# Patient Record
Sex: MALE | Race: WHITE | Employment: FULL TIME | URBAN - METROPOLITAN AREA
[De-identification: names, ages, dates, MRNs, and addresses within clinical notes are randomized per-mention and may not be internally consistent; named-entity substitution may affect disease eponyms.]

---

## 2021-04-25 ENCOUNTER — ANESTHESIA EVENT (OUTPATIENT)
Dept: SURGERY | Age: 36
End: 2021-04-25
Payer: COMMERCIAL

## 2021-04-25 ENCOUNTER — APPOINTMENT (OUTPATIENT)
Dept: GENERAL RADIOLOGY | Age: 36
End: 2021-04-25
Attending: ORTHOPAEDIC SURGERY
Payer: COMMERCIAL

## 2021-04-25 ENCOUNTER — ANESTHESIA (OUTPATIENT)
Dept: SURGERY | Age: 36
End: 2021-04-25
Payer: COMMERCIAL

## 2021-04-25 ENCOUNTER — HOSPITAL ENCOUNTER (OUTPATIENT)
Age: 36
Setting detail: OBSERVATION
Discharge: HOME | End: 2021-04-26
Attending: EMERGENCY MEDICINE | Admitting: ORTHOPAEDIC SURGERY
Payer: COMMERCIAL

## 2021-04-25 ENCOUNTER — APPOINTMENT (OUTPATIENT)
Dept: GENERAL RADIOLOGY | Age: 36
End: 2021-04-25
Attending: EMERGENCY MEDICINE
Payer: COMMERCIAL

## 2021-04-25 ENCOUNTER — APPOINTMENT (OUTPATIENT)
Dept: CT IMAGING | Age: 36
End: 2021-04-25
Attending: EMERGENCY MEDICINE
Payer: COMMERCIAL

## 2021-04-25 DIAGNOSIS — S42.91XA CLOSED FRACTURE DISLOCATION OF JOINT OF RIGHT SHOULDER GIRDLE, INITIAL ENCOUNTER: Primary | ICD-10-CM

## 2021-04-25 DIAGNOSIS — S51.001A OPEN WOUND OF RIGHT ELBOW, INITIAL ENCOUNTER: ICD-10-CM

## 2021-04-25 PROBLEM — S51.011A ELBOW LACERATION, RIGHT, INITIAL ENCOUNTER: Status: ACTIVE | Noted: 2021-04-25

## 2021-04-25 LAB
ALBUMIN SERPL-MCNC: 4.1 G/DL (ref 3.5–5)
ALBUMIN/GLOB SERPL: 1.2 {RATIO} (ref 1.2–3.5)
ALP SERPL-CCNC: 63 U/L (ref 50–136)
ALT SERPL-CCNC: 47 U/L (ref 12–65)
ANION GAP SERPL CALC-SCNC: 6 MMOL/L (ref 7–16)
AST SERPL-CCNC: 32 U/L (ref 15–37)
BASOPHILS # BLD: 0.1 K/UL (ref 0–0.2)
BASOPHILS NFR BLD: 0 % (ref 0–2)
BILIRUB SERPL-MCNC: 0.3 MG/DL (ref 0.2–1.1)
BUN SERPL-MCNC: 17 MG/DL (ref 6–23)
CALCIUM SERPL-MCNC: 9.1 MG/DL (ref 8.3–10.4)
CHLORIDE SERPL-SCNC: 107 MMOL/L (ref 98–107)
CO2 SERPL-SCNC: 27 MMOL/L (ref 21–32)
CREAT SERPL-MCNC: 1.31 MG/DL (ref 0.8–1.5)
DIFFERENTIAL METHOD BLD: ABNORMAL
EOSINOPHIL # BLD: 0.1 K/UL (ref 0–0.8)
EOSINOPHIL NFR BLD: 1 % (ref 0.5–7.8)
ERYTHROCYTE [DISTWIDTH] IN BLOOD BY AUTOMATED COUNT: 13.2 % (ref 11.9–14.6)
GLOBULIN SER CALC-MCNC: 3.3 G/DL (ref 2.3–3.5)
GLUCOSE SERPL-MCNC: 106 MG/DL (ref 65–100)
HCT VFR BLD AUTO: 42.4 % (ref 41.1–50.3)
HGB BLD-MCNC: 14.5 G/DL (ref 13.6–17.2)
IMM GRANULOCYTES # BLD AUTO: 0.1 K/UL (ref 0–0.5)
IMM GRANULOCYTES NFR BLD AUTO: 1 % (ref 0–5)
LYMPHOCYTES # BLD: 1.2 K/UL (ref 0.5–4.6)
LYMPHOCYTES NFR BLD: 6 % (ref 13–44)
MCH RBC QN AUTO: 31.8 PG (ref 26.1–32.9)
MCHC RBC AUTO-ENTMCNC: 34.2 G/DL (ref 31.4–35)
MCV RBC AUTO: 93 FL (ref 79.6–97.8)
MONOCYTES # BLD: 0.8 K/UL (ref 0.1–1.3)
MONOCYTES NFR BLD: 5 % (ref 4–12)
NEUTS SEG # BLD: 16.1 K/UL (ref 1.7–8.2)
NEUTS SEG NFR BLD: 88 % (ref 43–78)
NRBC # BLD: 0 K/UL (ref 0–0.2)
PLATELET # BLD AUTO: 283 K/UL (ref 150–450)
PMV BLD AUTO: 9.3 FL (ref 9.4–12.3)
POTASSIUM SERPL-SCNC: 3.8 MMOL/L (ref 3.5–5.1)
PROT SERPL-MCNC: 7.4 G/DL (ref 6.3–8.2)
RBC # BLD AUTO: 4.56 M/UL (ref 4.23–5.6)
SODIUM SERPL-SCNC: 140 MMOL/L (ref 136–145)
WBC # BLD AUTO: 18.3 K/UL (ref 4.3–11.1)

## 2021-04-25 PROCEDURE — 74011000258 HC RX REV CODE- 258: Performed by: EMERGENCY MEDICINE

## 2021-04-25 PROCEDURE — 74011000636 HC RX REV CODE- 636: Performed by: EMERGENCY MEDICINE

## 2021-04-25 PROCEDURE — 99218 HC RM OBSERVATION: CPT

## 2021-04-25 PROCEDURE — 74011250636 HC RX REV CODE- 250/636: Performed by: ANESTHESIOLOGY

## 2021-04-25 PROCEDURE — 99285 EMERGENCY DEPT VISIT HI MDM: CPT

## 2021-04-25 PROCEDURE — 77030003602 HC NDL NRV BLK BBMI -B: Performed by: ANESTHESIOLOGY

## 2021-04-25 PROCEDURE — 96376 TX/PRO/DX INJ SAME DRUG ADON: CPT

## 2021-04-25 PROCEDURE — 96375 TX/PRO/DX INJ NEW DRUG ADDON: CPT

## 2021-04-25 PROCEDURE — 73030 X-RAY EXAM OF SHOULDER: CPT

## 2021-04-25 PROCEDURE — 74011250637 HC RX REV CODE- 250/637: Performed by: ORTHOPAEDIC SURGERY

## 2021-04-25 PROCEDURE — 74011250636 HC RX REV CODE- 250/636: Performed by: ORTHOPAEDIC SURGERY

## 2021-04-25 PROCEDURE — 73080 X-RAY EXAM OF ELBOW: CPT

## 2021-04-25 PROCEDURE — 77030040361 HC SLV COMPR DVT MDII -B: Performed by: ORTHOPAEDIC SURGERY

## 2021-04-25 PROCEDURE — 76010000161 HC OR TIME 1 TO 1.5 HR INTENSV-TIER 1: Performed by: ORTHOPAEDIC SURGERY

## 2021-04-25 PROCEDURE — 77030039425 HC BLD LARYNG TRULITE DISP TELE -A: Performed by: ANESTHESIOLOGY

## 2021-04-25 PROCEDURE — A4565 SLINGS: HCPCS | Performed by: ORTHOPAEDIC SURGERY

## 2021-04-25 PROCEDURE — 77030008771 HC TU NG SALEM SUMP -A: Performed by: ANESTHESIOLOGY

## 2021-04-25 PROCEDURE — 74011250636 HC RX REV CODE- 250/636: Performed by: REGISTERED NURSE

## 2021-04-25 PROCEDURE — 80053 COMPREHEN METABOLIC PANEL: CPT

## 2021-04-25 PROCEDURE — 11043 DBRDMT MUSC&/FSCA 1ST 20/<: CPT | Performed by: ORTHOPAEDIC SURGERY

## 2021-04-25 PROCEDURE — 23665 CLTX SHO DSLC FX GR HMRL TBR: CPT | Performed by: ORTHOPAEDIC SURGERY

## 2021-04-25 PROCEDURE — 77030037088 HC TUBE ENDOTRACH ORAL NSL COVD-A: Performed by: ANESTHESIOLOGY

## 2021-04-25 PROCEDURE — 96365 THER/PROPH/DIAG IV INF INIT: CPT

## 2021-04-25 PROCEDURE — 74011000250 HC RX REV CODE- 250: Performed by: ANESTHESIOLOGY

## 2021-04-25 PROCEDURE — 74011000250 HC RX REV CODE- 250: Performed by: REGISTERED NURSE

## 2021-04-25 PROCEDURE — 2709999900 HC NON-CHARGEABLE SUPPLY: Performed by: ORTHOPAEDIC SURGERY

## 2021-04-25 PROCEDURE — 77030013708 HC HNDPC SUC IRR PULS STRY –B: Performed by: ORTHOPAEDIC SURGERY

## 2021-04-25 PROCEDURE — 76210000006 HC OR PH I REC 0.5 TO 1 HR: Performed by: ORTHOPAEDIC SURGERY

## 2021-04-25 PROCEDURE — 74177 CT ABD & PELVIS W/CONTRAST: CPT

## 2021-04-25 PROCEDURE — 85025 COMPLETE CBC W/AUTO DIFF WBC: CPT

## 2021-04-25 PROCEDURE — 76060000034 HC ANESTHESIA 1.5 TO 2 HR: Performed by: ORTHOPAEDIC SURGERY

## 2021-04-25 PROCEDURE — 74011250636 HC RX REV CODE- 250/636: Performed by: EMERGENCY MEDICINE

## 2021-04-25 PROCEDURE — 77030002916 HC SUT ETHLN J&J -A: Performed by: ORTHOPAEDIC SURGERY

## 2021-04-25 PROCEDURE — 77030040922 HC BLNKT HYPOTHRM STRY -A: Performed by: ANESTHESIOLOGY

## 2021-04-25 PROCEDURE — 76010010054 HC POST OP PAIN BLOCK: Performed by: ORTHOPAEDIC SURGERY

## 2021-04-25 RX ORDER — ROCURONIUM BROMIDE 10 MG/ML
INJECTION, SOLUTION INTRAVENOUS AS NEEDED
Status: DISCONTINUED | OUTPATIENT
Start: 2021-04-25 | End: 2021-04-25 | Stop reason: HOSPADM

## 2021-04-25 RX ORDER — AMOXICILLIN 250 MG
1 CAPSULE ORAL
Status: DISCONTINUED | OUTPATIENT
Start: 2021-04-25 | End: 2021-04-26 | Stop reason: HOSPADM

## 2021-04-25 RX ORDER — HYDROMORPHONE HYDROCHLORIDE 1 MG/ML
0.5 INJECTION, SOLUTION INTRAMUSCULAR; INTRAVENOUS; SUBCUTANEOUS
Status: DISCONTINUED | OUTPATIENT
Start: 2021-04-25 | End: 2021-04-25 | Stop reason: HOSPADM

## 2021-04-25 RX ORDER — HYDROMORPHONE HYDROCHLORIDE 1 MG/ML
1 INJECTION, SOLUTION INTRAMUSCULAR; INTRAVENOUS; SUBCUTANEOUS
Status: COMPLETED | OUTPATIENT
Start: 2021-04-25 | End: 2021-04-25

## 2021-04-25 RX ORDER — SODIUM CHLORIDE 0.9 % (FLUSH) 0.9 %
5-40 SYRINGE (ML) INJECTION AS NEEDED
Status: DISCONTINUED | OUTPATIENT
Start: 2021-04-25 | End: 2021-04-25 | Stop reason: HOSPADM

## 2021-04-25 RX ORDER — SUCCINYLCHOLINE CHLORIDE 20 MG/ML
INJECTION INTRAMUSCULAR; INTRAVENOUS AS NEEDED
Status: DISCONTINUED | OUTPATIENT
Start: 2021-04-25 | End: 2021-04-25 | Stop reason: HOSPADM

## 2021-04-25 RX ORDER — HYDROMORPHONE HYDROCHLORIDE 1 MG/ML
0.4 INJECTION, SOLUTION INTRAMUSCULAR; INTRAVENOUS; SUBCUTANEOUS
Status: COMPLETED | OUTPATIENT
Start: 2021-04-25 | End: 2021-04-25

## 2021-04-25 RX ORDER — ASPIRIN 325 MG
325 TABLET, DELAYED RELEASE (ENTERIC COATED) ORAL DAILY
Status: DISCONTINUED | OUTPATIENT
Start: 2021-04-26 | End: 2021-04-26 | Stop reason: HOSPADM

## 2021-04-25 RX ORDER — SODIUM CHLORIDE 0.9 % (FLUSH) 0.9 %
5-40 SYRINGE (ML) INJECTION AS NEEDED
Status: DISCONTINUED | OUTPATIENT
Start: 2021-04-25 | End: 2021-04-26 | Stop reason: HOSPADM

## 2021-04-25 RX ORDER — DIPHENHYDRAMINE HCL 25 MG
25 CAPSULE ORAL
Status: DISCONTINUED | OUTPATIENT
Start: 2021-04-25 | End: 2021-04-26 | Stop reason: HOSPADM

## 2021-04-25 RX ORDER — SODIUM CHLORIDE 9 MG/ML
100 INJECTION, SOLUTION INTRAVENOUS CONTINUOUS
Status: DISCONTINUED | OUTPATIENT
Start: 2021-04-26 | End: 2021-04-26 | Stop reason: HOSPADM

## 2021-04-25 RX ORDER — DIPHENHYDRAMINE HYDROCHLORIDE 50 MG/ML
12.5 INJECTION, SOLUTION INTRAMUSCULAR; INTRAVENOUS
Status: DISCONTINUED | OUTPATIENT
Start: 2021-04-25 | End: 2021-04-25 | Stop reason: HOSPADM

## 2021-04-25 RX ORDER — SODIUM CHLORIDE 0.9 % (FLUSH) 0.9 %
5-40 SYRINGE (ML) INJECTION EVERY 8 HOURS
Status: DISCONTINUED | OUTPATIENT
Start: 2021-04-25 | End: 2021-04-25 | Stop reason: HOSPADM

## 2021-04-25 RX ORDER — SODIUM CHLORIDE, SODIUM LACTATE, POTASSIUM CHLORIDE, CALCIUM CHLORIDE 600; 310; 30; 20 MG/100ML; MG/100ML; MG/100ML; MG/100ML
100 INJECTION, SOLUTION INTRAVENOUS CONTINUOUS
Status: DISCONTINUED | OUTPATIENT
Start: 2021-04-25 | End: 2021-04-25 | Stop reason: HOSPADM

## 2021-04-25 RX ORDER — SODIUM CHLORIDE, SODIUM LACTATE, POTASSIUM CHLORIDE, CALCIUM CHLORIDE 600; 310; 30; 20 MG/100ML; MG/100ML; MG/100ML; MG/100ML
INJECTION, SOLUTION INTRAVENOUS
Status: DISCONTINUED | OUTPATIENT
Start: 2021-04-25 | End: 2021-04-25 | Stop reason: HOSPADM

## 2021-04-25 RX ORDER — DEXAMETHASONE SODIUM PHOSPHATE 4 MG/ML
INJECTION, SOLUTION INTRA-ARTICULAR; INTRALESIONAL; INTRAMUSCULAR; INTRAVENOUS; SOFT TISSUE AS NEEDED
Status: DISCONTINUED | OUTPATIENT
Start: 2021-04-25 | End: 2021-04-25 | Stop reason: HOSPADM

## 2021-04-25 RX ORDER — CEFAZOLIN SODIUM/WATER 2 G/20 ML
2 SYRINGE (ML) INTRAVENOUS ONCE
Status: COMPLETED | OUTPATIENT
Start: 2021-04-25 | End: 2021-04-25

## 2021-04-25 RX ORDER — ONDANSETRON 2 MG/ML
INJECTION INTRAMUSCULAR; INTRAVENOUS AS NEEDED
Status: DISCONTINUED | OUTPATIENT
Start: 2021-04-25 | End: 2021-04-25 | Stop reason: HOSPADM

## 2021-04-25 RX ORDER — KETOROLAC TROMETHAMINE 30 MG/ML
INJECTION, SOLUTION INTRAMUSCULAR; INTRAVENOUS AS NEEDED
Status: DISCONTINUED | OUTPATIENT
Start: 2021-04-25 | End: 2021-04-25 | Stop reason: HOSPADM

## 2021-04-25 RX ORDER — OXYCODONE AND ACETAMINOPHEN 5; 325 MG/1; MG/1
1 TABLET ORAL AS NEEDED
Status: DISCONTINUED | OUTPATIENT
Start: 2021-04-25 | End: 2021-04-25 | Stop reason: HOSPADM

## 2021-04-25 RX ORDER — HYDROMORPHONE HYDROCHLORIDE 1 MG/ML
0.5 INJECTION, SOLUTION INTRAMUSCULAR; INTRAVENOUS; SUBCUTANEOUS
Status: COMPLETED | OUTPATIENT
Start: 2021-04-25 | End: 2021-04-25

## 2021-04-25 RX ORDER — ACETAMINOPHEN 325 MG/1
975 TABLET ORAL ONCE
Status: COMPLETED | OUTPATIENT
Start: 2021-04-25 | End: 2021-04-25

## 2021-04-25 RX ORDER — MIDAZOLAM HYDROCHLORIDE 1 MG/ML
2 INJECTION, SOLUTION INTRAMUSCULAR; INTRAVENOUS ONCE
Status: DISCONTINUED | OUTPATIENT
Start: 2021-04-25 | End: 2021-04-25 | Stop reason: HOSPADM

## 2021-04-25 RX ORDER — LIDOCAINE HYDROCHLORIDE 20 MG/ML
INJECTION, SOLUTION EPIDURAL; INFILTRATION; INTRACAUDAL; PERINEURAL AS NEEDED
Status: DISCONTINUED | OUTPATIENT
Start: 2021-04-25 | End: 2021-04-25 | Stop reason: HOSPADM

## 2021-04-25 RX ORDER — BUPIVACAINE HYDROCHLORIDE AND EPINEPHRINE 2.5; 5 MG/ML; UG/ML
INJECTION, SOLUTION EPIDURAL; INFILTRATION; INTRACAUDAL; PERINEURAL
Status: COMPLETED | OUTPATIENT
Start: 2021-04-25 | End: 2021-04-25

## 2021-04-25 RX ORDER — HYDROMORPHONE HYDROCHLORIDE 1 MG/ML
.5-1 INJECTION, SOLUTION INTRAMUSCULAR; INTRAVENOUS; SUBCUTANEOUS
Status: DISCONTINUED | OUTPATIENT
Start: 2021-04-25 | End: 2021-04-26 | Stop reason: HOSPADM

## 2021-04-25 RX ORDER — CEFAZOLIN SODIUM/WATER 2 G/20 ML
2 SYRINGE (ML) INTRAVENOUS EVERY 8 HOURS
Status: COMPLETED | OUTPATIENT
Start: 2021-04-26 | End: 2021-04-26

## 2021-04-25 RX ORDER — SODIUM CHLORIDE 0.9 % (FLUSH) 0.9 %
10 SYRINGE (ML) INJECTION
Status: COMPLETED | OUTPATIENT
Start: 2021-04-25 | End: 2021-04-25

## 2021-04-25 RX ORDER — LIDOCAINE HYDROCHLORIDE 20 MG/ML
INJECTION, SOLUTION EPIDURAL; INFILTRATION; INTRACAUDAL; PERINEURAL
Status: COMPLETED | OUTPATIENT
Start: 2021-04-25 | End: 2021-04-25

## 2021-04-25 RX ORDER — ACETAMINOPHEN 650 MG/1
650 SUPPOSITORY RECTAL ONCE
Status: COMPLETED | OUTPATIENT
Start: 2021-04-26 | End: 2021-04-25

## 2021-04-25 RX ORDER — FENTANYL CITRATE 50 UG/ML
INJECTION, SOLUTION INTRAMUSCULAR; INTRAVENOUS AS NEEDED
Status: DISCONTINUED | OUTPATIENT
Start: 2021-04-25 | End: 2021-04-25 | Stop reason: HOSPADM

## 2021-04-25 RX ORDER — BUPIVACAINE HYDROCHLORIDE AND EPINEPHRINE 5; 5 MG/ML; UG/ML
INJECTION, SOLUTION EPIDURAL; INTRACAUDAL; PERINEURAL
Status: COMPLETED | OUTPATIENT
Start: 2021-04-25 | End: 2021-04-25

## 2021-04-25 RX ORDER — SODIUM CHLORIDE 0.9 % (FLUSH) 0.9 %
5-40 SYRINGE (ML) INJECTION EVERY 8 HOURS
Status: DISCONTINUED | OUTPATIENT
Start: 2021-04-26 | End: 2021-04-26 | Stop reason: HOSPADM

## 2021-04-25 RX ORDER — NALOXONE HYDROCHLORIDE 0.4 MG/ML
0.4 INJECTION, SOLUTION INTRAMUSCULAR; INTRAVENOUS; SUBCUTANEOUS
Status: DISCONTINUED | OUTPATIENT
Start: 2021-04-25 | End: 2021-04-26 | Stop reason: HOSPADM

## 2021-04-25 RX ORDER — OXYCODONE AND ACETAMINOPHEN 7.5; 325 MG/1; MG/1
1-2 TABLET ORAL
Status: DISCONTINUED | OUTPATIENT
Start: 2021-04-25 | End: 2021-04-26 | Stop reason: HOSPADM

## 2021-04-25 RX ORDER — OXYCODONE HYDROCHLORIDE 5 MG/1
5 TABLET ORAL
Status: DISCONTINUED | OUTPATIENT
Start: 2021-04-25 | End: 2021-04-25 | Stop reason: HOSPADM

## 2021-04-25 RX ORDER — PROPOFOL 10 MG/ML
INJECTION, EMULSION INTRAVENOUS AS NEEDED
Status: DISCONTINUED | OUTPATIENT
Start: 2021-04-25 | End: 2021-04-25 | Stop reason: HOSPADM

## 2021-04-25 RX ORDER — ONDANSETRON 2 MG/ML
4 INJECTION INTRAMUSCULAR; INTRAVENOUS
Status: DISCONTINUED | OUTPATIENT
Start: 2021-04-25 | End: 2021-04-26 | Stop reason: HOSPADM

## 2021-04-25 RX ADMIN — SODIUM CHLORIDE, SODIUM LACTATE, POTASSIUM CHLORIDE, AND CALCIUM CHLORIDE 1000 ML: 600; 310; 30; 20 INJECTION, SOLUTION INTRAVENOUS at 16:46

## 2021-04-25 RX ADMIN — DEXAMETHASONE SODIUM PHOSPHATE 10 MG: 4 INJECTION, SOLUTION INTRAMUSCULAR; INTRAVENOUS at 21:39

## 2021-04-25 RX ADMIN — KETOROLAC TROMETHAMINE 30 MG: 30 INJECTION, SOLUTION INTRAMUSCULAR at 21:55

## 2021-04-25 RX ADMIN — SODIUM CHLORIDE 100 ML/HR: 900 INJECTION, SOLUTION INTRAVENOUS at 23:37

## 2021-04-25 RX ADMIN — PHENYLEPHRINE HYDROCHLORIDE 100 MCG: 10 INJECTION INTRAVENOUS at 21:41

## 2021-04-25 RX ADMIN — SODIUM CHLORIDE, SODIUM LACTATE, POTASSIUM CHLORIDE, AND CALCIUM CHLORIDE: 600; 310; 30; 20 INJECTION, SOLUTION INTRAVENOUS at 21:52

## 2021-04-25 RX ADMIN — BUPIVACAINE HYDROCHLORIDE AND EPINEPHRINE BITARTRATE 15 ML: 2.5; .005 INJECTION, SOLUTION EPIDURAL; INFILTRATION; INTRACAUDAL; PERINEURAL at 20:47

## 2021-04-25 RX ADMIN — MIDAZOLAM 2 MG: 1 INJECTION INTRAMUSCULAR; INTRAVENOUS at 20:41

## 2021-04-25 RX ADMIN — IOPAMIDOL 100 ML: 755 INJECTION, SOLUTION INTRAVENOUS at 18:14

## 2021-04-25 RX ADMIN — SUCCINYLCHOLINE CHLORIDE 200 MG: 20 INJECTION, SOLUTION INTRAMUSCULAR; INTRAVENOUS at 20:59

## 2021-04-25 RX ADMIN — ONDANSETRON 4 MG: 2 INJECTION INTRAMUSCULAR; INTRAVENOUS at 21:39

## 2021-04-25 RX ADMIN — HYDROMORPHONE HYDROCHLORIDE 1 MG: 1 INJECTION, SOLUTION INTRAMUSCULAR; INTRAVENOUS; SUBCUTANEOUS at 19:27

## 2021-04-25 RX ADMIN — BUPIVACAINE HYDROCHLORIDE AND EPINEPHRINE BITARTRATE 15 ML: 5; .005 INJECTION, SOLUTION EPIDURAL; INTRACAUDAL; PERINEURAL at 20:47

## 2021-04-25 RX ADMIN — HYDROMORPHONE HYDROCHLORIDE 0.5 MG: 1 INJECTION, SOLUTION INTRAMUSCULAR; INTRAVENOUS; SUBCUTANEOUS at 17:54

## 2021-04-25 RX ADMIN — ROCURONIUM BROMIDE 5 MG: 10 INJECTION, SOLUTION INTRAVENOUS at 20:59

## 2021-04-25 RX ADMIN — PROPOFOL 200 MG: 10 INJECTION, EMULSION INTRAVENOUS at 20:59

## 2021-04-25 RX ADMIN — SODIUM CHLORIDE 100 ML: 900 INJECTION, SOLUTION INTRAVENOUS at 18:14

## 2021-04-25 RX ADMIN — PHENYLEPHRINE HYDROCHLORIDE 100 MCG: 10 INJECTION INTRAVENOUS at 21:48

## 2021-04-25 RX ADMIN — HYDROMORPHONE HYDROCHLORIDE 0.4 MG: 1 INJECTION, SOLUTION INTRAMUSCULAR; INTRAVENOUS; SUBCUTANEOUS at 16:47

## 2021-04-25 RX ADMIN — PROPOFOL 40 MG: 10 INJECTION, EMULSION INTRAVENOUS at 22:13

## 2021-04-25 RX ADMIN — PROPOFOL 20 MG: 10 INJECTION, EMULSION INTRAVENOUS at 20:43

## 2021-04-25 RX ADMIN — SODIUM CHLORIDE, SODIUM LACTATE, POTASSIUM CHLORIDE, AND CALCIUM CHLORIDE: 600; 310; 30; 20 INJECTION, SOLUTION INTRAVENOUS at 20:55

## 2021-04-25 RX ADMIN — Medication 10 ML: at 18:15

## 2021-04-25 RX ADMIN — LIDOCAINE HYDROCHLORIDE 10 ML: 20 INJECTION, SOLUTION EPIDURAL; INFILTRATION; INTRACAUDAL; PERINEURAL at 20:47

## 2021-04-25 RX ADMIN — CEFAZOLIN 1 G: 1 INJECTION, POWDER, FOR SOLUTION INTRAVENOUS at 21:07

## 2021-04-25 RX ADMIN — LIDOCAINE HYDROCHLORIDE 100 MG: 20 INJECTION, SOLUTION EPIDURAL; INFILTRATION; INTRACAUDAL; PERINEURAL at 20:59

## 2021-04-25 RX ADMIN — ACETAMINOPHEN 975 MG: 325 TABLET ORAL at 23:37

## 2021-04-25 RX ADMIN — FENTANYL CITRATE 50 MCG: 50 INJECTION INTRAMUSCULAR; INTRAVENOUS at 20:59

## 2021-04-25 RX ADMIN — CEFAZOLIN 1 G: 1 INJECTION, POWDER, FOR SOLUTION INTRAMUSCULAR; INTRAVENOUS; PARENTERAL at 20:02

## 2021-04-25 RX ADMIN — PROPOFOL 30 MG: 10 INJECTION, EMULSION INTRAVENOUS at 20:41
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
## 2021-04-25 NOTE — ED NOTES
Report from [facility], [address] [place] [address]. Assumed care of pt at this time.
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
## 2021-04-26 ENCOUNTER — APPOINTMENT (OUTPATIENT)
Dept: CT IMAGING | Age: 36
End: 2021-04-26
Attending: PHYSICIAN ASSISTANT
Payer: COMMERCIAL

## 2021-04-26 VITALS
SYSTOLIC BLOOD PRESSURE: 112 MMHG | WEIGHT: 189 LBS | RESPIRATION RATE: 16 BRPM | HEIGHT: 72 IN | OXYGEN SATURATION: 95 % | TEMPERATURE: 98.4 F | DIASTOLIC BLOOD PRESSURE: 59 MMHG | HEART RATE: 94 BPM | BODY MASS INDEX: 25.6 KG/M2

## 2021-04-26 PROCEDURE — 74011000250 HC RX REV CODE- 250: Performed by: ORTHOPAEDIC SURGERY

## 2021-04-26 PROCEDURE — 94760 N-INVAS EAR/PLS OXIMETRY 1: CPT

## 2021-04-26 PROCEDURE — 2709999900 HC NON-CHARGEABLE SUPPLY

## 2021-04-26 PROCEDURE — 74011250637 HC RX REV CODE- 250/637: Performed by: ORTHOPAEDIC SURGERY

## 2021-04-26 PROCEDURE — 73200 CT UPPER EXTREMITY W/O DYE: CPT

## 2021-04-26 PROCEDURE — 99218 HC RM OBSERVATION: CPT

## 2021-04-26 PROCEDURE — 74011250636 HC RX REV CODE- 250/636: Performed by: ORTHOPAEDIC SURGERY

## 2021-04-26 RX ORDER — ASPIRIN 325 MG
325 TABLET, DELAYED RELEASE (ENTERIC COATED) ORAL DAILY
Qty: 21 TAB | Refills: 0 | Status: SHIPPED | OUTPATIENT
Start: 2021-04-27 | End: 2021-05-04

## 2021-04-26 RX ORDER — AMOXICILLIN 250 MG
1 CAPSULE ORAL
Qty: 30 TAB | Refills: 0 | Status: SHIPPED | OUTPATIENT
Start: 2021-04-26 | End: 2021-08-12

## 2021-04-26 RX ORDER — CEPHALEXIN 500 MG/1
500 CAPSULE ORAL 4 TIMES DAILY
Qty: 28 CAP | Refills: 0 | Status: SHIPPED | OUTPATIENT
Start: 2021-04-26 | End: 2021-05-04

## 2021-04-26 RX ORDER — OXYCODONE AND ACETAMINOPHEN 7.5; 325 MG/1; MG/1
1-2 TABLET ORAL
Qty: 40 TAB | Refills: 0 | Status: SHIPPED | OUTPATIENT
Start: 2021-04-26 | End: 2021-04-29

## 2021-04-26 RX ADMIN — ASPIRIN 325 MG: 325 TABLET, COATED ORAL at 08:52

## 2021-04-26 RX ADMIN — CEFAZOLIN SODIUM 2 G: 10 INJECTION, POWDER, FOR SOLUTION INTRAVENOUS at 12:02

## 2021-04-26 RX ADMIN — OXYCODONE HYDROCHLORIDE AND ACETAMINOPHEN 2 TABLET: 7.5; 325 TABLET ORAL at 13:20

## 2021-04-26 RX ADMIN — CEFAZOLIN SODIUM 2 G: 10 INJECTION, POWDER, FOR SOLUTION INTRAVENOUS at 05:44

## 2021-04-26 NOTE — DISCHARGE INSTRUCTIONS
Shoulder Postoperative Instructions    Returning Home  1. Your pain after surgery will vary depending on the method of anesthesia used and from patient to patient. In the first 24 hours, pain medication should be taken regularly with small amounts of food. During this time, nausea and light-headedness are common and should improve in 2-5 days. Drinking fluids may help. If nausea persists, medicine can be prescribed by calling your doctor at [phone]. Leaving the Outpatient Surgery Center:  As you leave the surgery center, you will be in a sling. Make sure that you have a large shirt or a button up shirt to wear home. For the first week:  1. Sleeping and resting will be more comfortable if you are propped up in bed or have access to a recliner. 2. Ice your shoulder to help manage the pain. 20 minutes of ice every hour as needed. 3. You may come out of the sling 3-5x/day to do elbow, wrist and hand range of motion, and other home exercises (listed further down in - Home Excersizes) so your other joints do not get stiff. Sling Use  You will be in the sling for comfort. You may take the sling off to do your home exercises or physical therapy, or shower as well. Care of Your Incisions  1. Incisions and stitches are often checked/removed 6 to 10 days after surgery. 2. Moderate bleeding may occur at the incision sites. This should decrease quickly over time. 3. Leave the dressings from surgery in place for 48 to 72 hours. The bulky dressings may be removed and replaced with fresh gauze at that time, but leave on the small tape strips on the incision sites. 4. Watch the wound for increasing redness, tenderness, swelling, and pus drainage daily. These can be early signs of infection. If you notice any of these signs of infection please call [phone]. A mild fever during the first few days after surgery is not uncommon.  This often occurs and can be treated with deep breathing, coughing to clear the lungs, and walking. However, fevers, increasing pain, and swelling at the incisions should be reported immediately. Showering:   Until your sutures are removed, you should consider covering your shoulder in saran wrap with tape for showering.  A plastic chair in your shower will allow you to sit.  Sponge bathing is also an option.  In general, after your sutures have been removed you may allow your incisions to get wet in the shower. Post-Operative Pain Management  ANESTHESIA: You will meet with an anesthesiologist on the day of surgery to discuss your anesthesia. You will have general anesthesia and often will have a nerve block. MEDICATIONS: You will be given a prescription for medications. Please take them as directed on the label and with food.  Certain pain medications may contain Tylenol(Acetaminophen). It is important not to take any additional Tylenol while on these pain medications.  Do not mix your pain medications with alcohol.  You should not drive while taking pain medications as they increase your liability and delay your responses.  Your physician will most likely talk with you about taking Aspirin 81 mg or 325 mg (ECASA) once daily for two-three weeks after surgery. This is done in order to help minimize the risk for a blood clot from developing, which is a possible complication after any surgery. If you have Ulcers or stomach irritation do not take this medicine. Be careful taking aspirin and other NSAID's as it can increase your risk of gastric irritation and other side effects.  If you have any questions or concerns regarding your medications, please call the office. · Common side effects of the narcotics include nausea, vomiting, drowsiness, constipation, and difficulty urinating. If you experience constipation, drink lots of water/Gatorade, avoid soda and diet drinks. Eat plenty of fiber.  You may take a stool softener: Colace 100mg twice a day for the first week. For severe constipation use magnesium citrate, one 8 oz bottle. All can be bought at the pharmacy. Diet and General Conditioning  Aerobic conditioning and diet are both very important after surgery. In general, we recommend that you make sure to avoid skipping meals, eat a balanced diet including regular portions of fruits and vegetables, and avoid relying on fast foods while you are recovering from surgery. Also, consider taking a daily multi-vitamin. Participate in some form of aerobic conditioning after surgery. Speak with your physical therapist or call our office to determine an appropriate form of exercise after surgery. Initially, your exercise will need to be modified after surgery. Follow Up Visits  Doctor  Plan on seeing your surgeon at 1 week, 1 month, 3 months, and 6 months after surgery. If your shoulder does not progress as planned, you are welcome to schedule additional visits. There is usually no charge for surgery related visits 90 days following surgery. You will receive a bill for any x-rays or special equipment (such as a brace). If you call the office please have us paged instead of leaving a voice mail so that we can immediately take care of your needs.     [address]                 Phone [phone]  [place] [address]                 Fax [phone]                             Danilo Mirza
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
## 2021-04-26 NOTE — PROGRESS NOTES
Pt in bed resting, bed low and locked, call light within reach, side rails up x3. Shift assessment complete, pt has +2 radial and pedal pulses, RUE is numb and pt cannot yet squeeze with that hand. Pt had voided 400ml on arrival and complains of no pain. no needs at this time, will continue to monitor.

## 2021-04-26 NOTE — PROGRESS NOTES
2021         Post Op day: 1 Day Post-Op     Admit Date: 2021  Admit Diagnosis: Closed fracture dislocation of right shoulder joint [S42.91XA]    Subjective: Doing well, No complaints, No SOB, No Chest Pain, No Nausea or Vomitting. PT/OT:            Assistive Device: Sling                Weight Bearing Status: NWB RUE  BMP:  Recent Labs     21  1651   CREA 1.31   BUN 17      K 3.8      CO2 27   AGAP 6*   *     Patient Vitals for the past 8 hrs:   BP Temp Pulse Resp SpO2   21 0805     95 %   21 0703 107/60 98.2 °F (36.8 °C) 68 18 97 %     Temp (24hrs), Av °F (36.7 °C), Min:97.5 °F (36.4 °C), Max:98.2 °F (36.8 °C)    CBC:  Recent Labs     21  1651   WBC 18.3*   GRANS 88*   MONOS 5   EOS 1   ANEU 16.1*   ABL 1.2   HGB 14.5   HCT 42.4          Microbiology:     All Micro Results     None        Objective: Vital Signs are Stable, No Acute Distress, Alert and Oriented  Dressing is Dry,  Neurovascular exam is normal.    Assessment:  Patient Active Problem List   Diagnosis Code    Traumatic closed displaced fracture of right shoulder with anterior dislocation S42. 91XA    Elbow laceration, right, initial encounter S51.011A    Closed fracture dislocation of right shoulder joint S42. 91XA       Plan:     Dispo after last dose of ancef. Will be on keflex 7 days.  Will fu on Thursday am.       Signed By: Magdi Toledo MD

## 2021-04-26 NOTE — PROGRESS NOTES
Pt resting in bed, call light within reach, side rails up x 3, and bed low and locked. Shift assessment complete. Pt able to move fingers on right hand with + 2 radial pulse. Pt states he has no pain at this time. Pt educated on how to order food. No needs at this time.
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
## 2021-04-26 NOTE — PROGRESS NOTES
2021         Post Op day: 1 Day Post-Op     Admit Date: 2021  Admit Diagnosis: Closed fracture dislocation of right shoulder joint [S42.91XA]    Subjective: Doing well s/p closed reduction of right shoulder and irrigation and debridement of right elbow, No complaints, No SOB, No Chest Pain, No Nausea or Vomitting. PT/OT:            Assistive Device: Sling                Weight Bearing Status: RUE in sling  BMP:  Recent Labs     21  1651   CREA 1.31   BUN 17      K 3.8      CO2 27   AGAP 6*   *     Patient Vitals for the past 8 hrs:   BP Temp Pulse Resp SpO2   21 0805     95 %   21 0703 107/60 98.2 °F (36.8 °C) 68 18 97 %   21 0319 105/62 98.1 °F (36.7 °C) 60 18 97 %     Temp (24hrs), Av °F (36.7 °C), Min:97.5 °F (36.4 °C), Max:98.2 °F (36.8 °C)    CBC:  Recent Labs     21  1651   WBC 18.3*   GRANS 88*   MONOS 5   EOS 1   ANEU 16.1*   ABL 1.2   HGB 14.5   HCT 42.4          Microbiology:     All Micro Results     None        Objective: Vital Signs are Stable, No Acute Distress, Alert and Oriented  Dressing is Dry,  Neurovascular exam is normal.    CT:   FINDINGS: Again demonstrated is a comminuted and displaced fracture of the  proximal right anterior, superior, and lateral humerus. No evidence of humerus  fracture line extending to the articular surface.     Additionally there is an oblique minimally displaced fracture of the inferior  scapular glenoid, with extension to the articular surface. No evidence of  dislocation at this time. Small amount surrounding fluid and hematoma noted  within soft tissues.      The trachea demonstrates a trace amount of intraluminal debris. The lung apices  demonstrate no pneumothorax. There is partially visualized dependent opacity in  the right posterior lung most likely representing atelectasis or pneumonitis.   Edema and stranding are noted in the right axillary soft tissues of unclear  significance and etiology.        IMPRESSION  1. Comminuted displaced fracture of proximal right humerus. 2. Right scapula fracture. Assessment:  Patient Active Problem List   Diagnosis Code    Traumatic closed displaced fracture of right shoulder with anterior dislocation S42. 91XA    Elbow laceration, right, initial encounter S51.011A    Closed fracture dislocation of right shoulder joint S42. 91XA       Plan: Monitor labs  CT performed today - resulted comminuted displaced fracture of proximal right humerus, inferior glenoid fracture   Discussed with patient it will require surgical intervention, but we need to resolve elbow first.   Will D/C home with close FU on Thursday     Signed By: FRANDY uHtchinson

## 2021-04-26 NOTE — PERIOP NOTES
TRANSFER - OUT REPORT:    Verbal report given to receiving nurse(name) on Chris Lies being transferred to [facility](unit) for routine progression of care       Report consisted of patient's Situation, Background, Assessment and   Recommendations(SBAR). Information from the following report(s) OR Summary, Procedure Summary, Intake/Output and MAR was reviewed with the receiving nurse. Opportunity for questions and clarification was provided.       Patient transported with:   O2 @ 3 liters  Tech

## 2021-04-26 NOTE — ANESTHESIA POSTPROCEDURE EVALUATION
Procedure(s):  ELBOW DEBRIDEMENT & wash out  CLOSED REDUCTION UPPER EXTREMITY, Right shoulder disclosed fracture.     general    Anesthesia Post Evaluation      Multimodal analgesia: multimodal analgesia used between 6 hours prior to anesthesia start to PACU discharge  Patient location during evaluation: bedside  Patient participation: complete - patient participated  Level of consciousness: awake  Pain management: adequate  Airway patency: patent  Anesthetic complications: no  Cardiovascular status: acceptable and stable  Respiratory status: acceptable and nasal cannula  Hydration status: acceptable  Post anesthesia nausea and vomiting:  none  Final Post Anesthesia Temperature Assessment:  Normothermia (36.0-37.5 degrees C)      INITIAL Post-op Vital signs:   Vitals Value Taken Time   /65 04/25/21 2238   Temp     Pulse 81 04/25/21 2238   Resp 15 04/25/21 2238   SpO2 95 % 04/25/21 2238

## 2021-04-26 NOTE — PERIOP NOTES
Moderate sedation. MD Carolina at bedside at 2037. Time out performed at 2040 with RN present. 2 mg of versed given 2041 by RN and Propofol given by MD Arcelia Mcclain. Vitals taken at 2040 and 2045. Procedure over at 2047.

## 2021-04-26 NOTE — DISCHARGE SUMMARY
DC Summary:    Patient ID:  Amara Koo  [id number]   29 y.o.  1985    Admit date: 4/25/2021    Discharge Date: 4/26/2021      Admitting Physician: Shala White MD     Discharge Physician: Shala White MD    Admission Diagnoses: Closed fracture dislocation of right shoulder joint [S42.91XA]    Last Procedure: Procedure(s):  ELBOW DEBRIDEMENT & wash out  CLOSED REDUCTION UPPER EXTREMITY, Right shoulder disclosed fracture    Discharge Diagnoses: Principal Problem:    Traumatic closed displaced fracture of right shoulder with anterior dislocation (4/25/2021)    Active Problems:    Elbow laceration, right, initial encounter (4/25/2021)      Closed fracture dislocation of right shoulder joint (4/25/2021)         Consults: None    Significant Diagnostic Studies:  CT SCan    Hospital Course:   Normal hospital course for this procedure. He has a greater tuberosity fracture that will need ORIF as an outpatient. We will proceed once we know his elbow is healing well from his laceration. Disposition: Home    Patient Instructions:   Current Discharge Medication List      START taking these medications    Details   aspirin delayed-release 325 mg tablet Take 1 Tab by mouth daily. Qty: 21 Tab, Refills: 0      senna-docusate (PERICOLACE) 8.6-50 mg per tablet Take 1 Tab by mouth two (2) times daily as needed for Constipation. Qty: 30 Tab, Refills: 0      cephALEXin (KEFLEX) 500 mg capsule Take 1 Cap by mouth four (4) times daily for 7 days. Qty: 28 Cap, Refills: 0      oxyCODONE-acetaminophen (PERCOCET 7.5) 7.5-325 mg per tablet Take 1-2 Tabs by mouth every four (4) hours as needed for Pain for up to 3 days. Max Daily Amount: 12 Tabs. Qty: 40 Tab, Refills: 0    Associated Diagnoses: Closed fracture dislocation of joint of right shoulder girdle, initial encounter; Open wound of right elbow, initial encounter             Diet: Reference my discharge instructions.     Activity: Reference my discharge instructions. Follow-up Appointments   Procedures    FOLLOW UP VISIT Appointment in: Other (Sukh Matamoros) Thursday AM [facility] office. Thursday AM [facility] office. Standing Status:   Standing     Number of Occurrences:   1     Order Specific Question:   Appointment in     Answer:    Other (Specify)          Signed:  Augustin Madera MD  April 26, 2021  12:35 PM

## 2021-04-26 NOTE — PROGRESS NOTES
TRANSFER - IN REPORT:    Verbal report received from Erum Muse RN(name) on Mikaela Vogt  being received from [email or web address]) for routine progression of care      Report consisted of patients Situation, Background, Assessment and   Recommendations(SBAR). Information from the following report(s) SBAR, Kardex, OR Summary, Procedure Summary, Intake/Output, MAR and Recent Results was reviewed with the receiving nurse. Opportunity for questions and clarification was provided. Assessment completed upon patients arrival to unit and care assumed.
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
## 2021-04-26 NOTE — PROGRESS NOTES
04/25/21 6731   Dual Skin Pressure Injury Assessment   Dual Skin Pressure Injury Assessment WDL   Second Care Provider (Based on [address] [place] [address]) Sutton, RN   Skin Integumentary   Skin Integumentary (WDL) X    Pressure  Injury Documentation No Pressure Injury Noted-Pressure Ulcer Prevention Initiated   Skin Integrity Incision (comment)  (Surgical incision to right elbow)   Skin Color Appropriate for ethnicity   Skin Condition/Temp Dry; Warm   Turgor Non-tenting   Hair Growth Present   Nails WDL   Varicosities Absent

## 2021-04-26 NOTE — ED NOTES
Consents signed and given to OR team after surgeon spoke with pt. Pt placed in gown. OR team at bedside at this time to transport pt to OR.

## 2021-04-26 NOTE — PROGRESS NOTES
Discharge instructions reviewed, prescriptions given to pt. Pt taken down via wheelchair, home with spouse.

## 2021-04-26 NOTE — ANESTHESIA PREPROCEDURE EVALUATION
Relevant Problems   No relevant active problems       Anesthetic History   No history of anesthetic complications            Review of Systems / Medical History  Patient summary reviewed and pertinent labs reviewed    Pulmonary  Within defined limits                 Neuro/Psych   Within defined limits           Cardiovascular                  Exercise tolerance: >4 METS     GI/Hepatic/Renal  Within defined limits              Endo/Other  Within defined limits           Other Findings              Physical Exam    Airway  Mallampati: I  TM Distance: 4 - 6 cm  Neck ROM: normal range of motion   Mouth opening: Normal     Cardiovascular  Regular rate and rhythm,  S1 and S2 normal,  no murmur, click, rub, or gallop  Rhythm: regular  Rate: normal         Dental  No notable dental hx       Pulmonary  Breath sounds clear to auscultation               Abdominal  GI exam deferred       Other Findings            Anesthetic Plan    ASA: 2, emergent  Anesthesia type: general      Post-op pain plan if not by surgeon: peripheral nerve block single    Induction: Intravenous  Anesthetic plan and risks discussed with: Patient

## 2021-04-26 NOTE — ANESTHESIA PROCEDURE NOTES
Peripheral Block    Start time: 4/25/2021 8:41 PM  End time: 4/25/2021 8:47 PM  Performed by: Ifeoma De La Garza MD  Authorized by: Ifeoma De La Garza MD       Pre-procedure: Indications: at surgeon's request and post-op pain management    Preanesthetic Checklist: patient identified, risks and benefits discussed, site marked, timeout performed, anesthesia consent given and patient being monitored    Timeout Time: 20:41          Block Type:   Block Type:   Interscalene  Laterality:  Right  Monitoring:  Standard ASA monitoring, continuous pulse ox, frequent vital sign checks, heart rate and oxygen  Injection Technique:  Single shot  Procedures: ultrasound guided and nerve stimulator    Patient Position: supine  Prep: chlorhexidine    Location:  Interscalene  Needle Type:  Stimuplex  Needle Gauge:  22 G  Needle Localization:  Nerve stimulator and ultrasound guidance  Motor Response comment:   Motor Response: minimal motor response >0.4 mA   Medication Injected:  Lidocaine (XYLOCAINE) Preserv-Free 2% injection, 10 mL  bupivacaine 0.25% -EPINEPHrine 1:200,000 (SENSORCAINE) mg injection, 15 mL  bupivacaine-EPINEPHrine (PF)(SENSORCAINE) 0.5%-1:200,000 mg injection, 15 mL  Med Admin Time: 4/25/2021 8:47 PM    Assessment:  Number of attempts:  1  Injection Assessment:  Local visualized surrounding nerve on ultrasound, negative aspiration for CSF, negative aspiration for blood, no paresthesia, no intravascular symptoms, ultrasound image on chart and incremental injection every 5 mL  Patient tolerance:  Patient tolerated the procedure well with no immediate complications

## 2021-04-27 NOTE — ADDENDUM NOTE
Addendum  created 04/27/21 0813 by Mio Brewer CRNA    Flowsheet accepted, Intraprocedure Flowsheets edited

## 2021-04-29 ENCOUNTER — HOSPITAL ENCOUNTER (EMERGENCY)
Age: 36
Discharge: HOME | End: 2021-04-29
Attending: EMERGENCY MEDICINE
Payer: COMMERCIAL

## 2021-04-29 ENCOUNTER — APPOINTMENT (OUTPATIENT)
Dept: GENERAL RADIOLOGY | Age: 36
End: 2021-04-29
Attending: PHYSICIAN ASSISTANT
Payer: COMMERCIAL

## 2021-04-29 ENCOUNTER — APPOINTMENT (OUTPATIENT)
Dept: ULTRASOUND IMAGING | Age: 36
End: 2021-04-29
Attending: PHYSICIAN ASSISTANT
Payer: COMMERCIAL

## 2021-04-29 VITALS
SYSTOLIC BLOOD PRESSURE: 120 MMHG | OXYGEN SATURATION: 100 % | HEART RATE: 75 BPM | RESPIRATION RATE: 18 BRPM | BODY MASS INDEX: 25.6 KG/M2 | WEIGHT: 189 LBS | TEMPERATURE: 98.2 F | HEIGHT: 72 IN | DIASTOLIC BLOOD PRESSURE: 75 MMHG

## 2021-04-29 DIAGNOSIS — M79.89 SWELLING OF RIGHT HAND: Primary | ICD-10-CM

## 2021-04-29 PROCEDURE — 93971 EXTREMITY STUDY: CPT

## 2021-04-29 PROCEDURE — 73030 X-RAY EXAM OF SHOULDER: CPT

## 2021-04-29 PROCEDURE — 99283 EMERGENCY DEPT VISIT LOW MDM: CPT
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
## 2021-04-30 NOTE — ED NOTES
I have reviewed discharge instructions with the patient. The patient verbalized understanding. Patient left ED via Discharge Method: ambulatory to Home with self. Opportunity for questions and clarification provided. Patient given 0 scripts. To continue your aftercare when you leave the hospital, you may receive an automated call from our care team to check in on how you are doing. This is a free service and part of our promise to provide the best care and service to meet your aftercare needs.  If you have questions, or wish to unsubscribe from this service please call [phone]. Thank you for Choosing our [facility] Emergency Department.

## 2021-05-01 ENCOUNTER — HOSPITAL ENCOUNTER (EMERGENCY)
Age: 36
Discharge: HOME | End: 2021-05-01
Attending: EMERGENCY MEDICINE
Payer: COMMERCIAL

## 2021-05-01 VITALS
DIASTOLIC BLOOD PRESSURE: 80 MMHG | SYSTOLIC BLOOD PRESSURE: 142 MMHG | RESPIRATION RATE: 16 BRPM | TEMPERATURE: 98.7 F | OXYGEN SATURATION: 94 % | HEART RATE: 94 BPM

## 2021-05-01 DIAGNOSIS — Z48.00 DRESSING CHANGE: Primary | ICD-10-CM

## 2021-05-01 PROCEDURE — 99283 EMERGENCY DEPT VISIT LOW MDM: CPT
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
## 2021-05-01 NOTE — ED NOTES
I have reviewed discharge instructions with the patient. The patient verbalized understanding. Patient left ED via Discharge Method: ambulatory to Home with a family member. Opportunity for questions and clarification provided. Patient given 0 scripts. To continue your aftercare when you leave the hospital, you may receive an automated call from our care team to check in on how you are doing.  This is a free service and part of our promise to provide the best care and service to meet your aftercare needs. \" If you have questions, or wish to unsubscribe from this service please call [phone].  Thank you for Choosing our [address] [place] [address] Emergency Department.

## 2021-05-01 NOTE — ED PROVIDER NOTES
37 y/o m to ed for dressing change right elbow. He had elbow debridement and washout 4/25/2021 with Dr Arya Le. Was at his child's soccer game today and had bleeding though his bandage. Dr Arya Le happened to be at the game and instructed him to come here for new bandage. Wound intact, 8 sutures intact, no drainage or dc at present. Pos pulses distally           No past medical history on file. No past surgical history on file. No family history on file. Social History     Socioeconomic History    Marital status:      Spouse name: Not on file    Number of children: Not on file    Years of education: Not on file    Highest education level: Not on file   Occupational History    Not on file   Social Needs    Financial resource strain: Not on file    Food insecurity     Worry: Not on file     Inability: Not on file    Transportation needs     Medical: Not on file     Non-medical: Not on file   Tobacco Use    Smoking status: Not on file   Substance and Sexual Activity    Alcohol use: Not on file    Drug use: Not on file    Sexual activity: Not on file   Lifestyle    Physical activity     Days per week: Not on file     Minutes per session: Not on file    Stress: Not on file   Relationships    Social connections     Talks on phone: Not on file     Gets together: Not on file     Attends Restorationist service: Not on file     Active member of club or organization: Not on file     Attends meetings of clubs or organizations: Not on file     Relationship status: Not on file    Intimate partner violence     Fear of current or ex partner: Not on file     Emotionally abused: Not on file     Physically abused: Not on file     Forced sexual activity: Not on file   Other Topics Concern    Not on file   Social History Narrative    Not on file         ALLERGIES: Patient has no known allergies. Review of Systems   Constitutional: Negative for chills and fatigue. Musculoskeletal: Positive for myalgias. Skin: Positive for color change and wound. Vitals:    05/01/21 1421   BP: (!) 142/80   Pulse: 94   Resp: 16   Temp: 98.7 °F (37.1 °C)   SpO2: 94%            Physical Exam  Vitals signs and nursing note reviewed. HENT:      Head: Normocephalic. Nose: Nose normal.   Eyes:      Pupils: Pupils are equal, round, and reactive to light. Neck:      Musculoskeletal: Normal range of motion. Cardiovascular:      Rate and Rhythm: Normal rate. Pulmonary:      Effort: Pulmonary effort is normal.   Musculoskeletal:         General: Swelling and tenderness present. Right shoulder: He exhibits decreased range of motion, tenderness and swelling. He exhibits normal pulse. Arms:    Skin:     General: Skin is warm and dry. Neurological:      General: No focal deficit present. Mental Status: He is alert. Psychiatric:         Mood and Affect: Mood normal.         Judgment: Judgment normal.          MDM  Number of Diagnoses or Management Options  Diagnosis management comments: Wound without sign of infection.   Will clean with saline and place clean dry dressing    Risk of Complications, Morbidity, and/or Mortality  Presenting problems: minimal  Diagnostic procedures: minimal  Management options: minimal    Patient Progress  Patient progress: stable         Procedures

## 2021-05-04 ENCOUNTER — ANESTHESIA EVENT (OUTPATIENT)
Dept: SURGERY | Age: 36
End: 2021-05-04
Payer: COMMERCIAL

## 2021-05-05 ENCOUNTER — ANESTHESIA (OUTPATIENT)
Dept: SURGERY | Age: 36
End: 2021-05-05
Payer: COMMERCIAL

## 2021-05-05 ENCOUNTER — HOSPITAL ENCOUNTER (OUTPATIENT)
Age: 36
Setting detail: OUTPATIENT SURGERY
Discharge: HOME | End: 2021-05-05
Attending: ORTHOPAEDIC SURGERY | Admitting: ORTHOPAEDIC SURGERY
Payer: COMMERCIAL

## 2021-05-05 ENCOUNTER — APPOINTMENT (OUTPATIENT)
Dept: GENERAL RADIOLOGY | Age: 36
End: 2021-05-05
Attending: ORTHOPAEDIC SURGERY
Payer: COMMERCIAL

## 2021-05-05 VITALS
RESPIRATION RATE: 12 BRPM | DIASTOLIC BLOOD PRESSURE: 59 MMHG | OXYGEN SATURATION: 96 % | SYSTOLIC BLOOD PRESSURE: 117 MMHG | TEMPERATURE: 98.2 F | BODY MASS INDEX: 25.63 KG/M2 | WEIGHT: 189 LBS | HEART RATE: 64 BPM

## 2021-05-05 PROCEDURE — 77030016458 HC BIT DRL CANN1 SYNT -F: Performed by: ORTHOPAEDIC SURGERY

## 2021-05-05 PROCEDURE — 77030040922 HC BLNKT HYPOTHRM STRY -A: Performed by: ANESTHESIOLOGY

## 2021-05-05 PROCEDURE — 74011250636 HC RX REV CODE- 250/636

## 2021-05-05 PROCEDURE — C1769 GUIDE WIRE: HCPCS | Performed by: ORTHOPAEDIC SURGERY

## 2021-05-05 PROCEDURE — 23615 OPTX PROX HUMRL FX W/INT FIX: CPT | Performed by: ORTHOPAEDIC SURGERY

## 2021-05-05 PROCEDURE — 76010010054 HC POST OP PAIN BLOCK: Performed by: ORTHOPAEDIC SURGERY

## 2021-05-05 PROCEDURE — 2709999900 HC NON-CHARGEABLE SUPPLY: Performed by: ORTHOPAEDIC SURGERY

## 2021-05-05 PROCEDURE — 74011250636 HC RX REV CODE- 250/636: Performed by: NURSE ANESTHETIST, CERTIFIED REGISTERED

## 2021-05-05 PROCEDURE — 74011000250 HC RX REV CODE- 250: Performed by: NURSE ANESTHETIST, CERTIFIED REGISTERED

## 2021-05-05 PROCEDURE — 77030040361 HC SLV COMPR DVT MDII -B: Performed by: ORTHOPAEDIC SURGERY

## 2021-05-05 PROCEDURE — 77030003602 HC NDL NRV BLK BBMI -B: Performed by: ANESTHESIOLOGY

## 2021-05-05 PROCEDURE — 77030002982 HC SUT POLYSRB J&J -A: Performed by: ORTHOPAEDIC SURGERY

## 2021-05-05 PROCEDURE — 74011250636 HC RX REV CODE- 250/636: Performed by: ORTHOPAEDIC SURGERY

## 2021-05-05 PROCEDURE — 77030002966 HC SUT PDS J&J -A: Performed by: ORTHOPAEDIC SURGERY

## 2021-05-05 PROCEDURE — 77030039034 HC DIL ENDO DISP HEALICOIL SN -C: Performed by: ORTHOPAEDIC SURGERY

## 2021-05-05 PROCEDURE — 76060000036 HC ANESTHESIA 2.5 TO 3 HR: Performed by: ORTHOPAEDIC SURGERY

## 2021-05-05 PROCEDURE — 77030019908 HC STETH ESOPH SIMS -A: Performed by: ANESTHESIOLOGY

## 2021-05-05 PROCEDURE — 77030010507 HC ADH SKN DERMBND J&J -B: Performed by: ORTHOPAEDIC SURGERY

## 2021-05-05 PROCEDURE — 76210000020 HC REC RM PH II FIRST 0.5 HR: Performed by: ORTHOPAEDIC SURGERY

## 2021-05-05 PROCEDURE — 74011000250 HC RX REV CODE- 250: Performed by: ANESTHESIOLOGY

## 2021-05-05 PROCEDURE — 77030037088 HC TUBE ENDOTRACH ORAL NSL COVD-A: Performed by: ANESTHESIOLOGY

## 2021-05-05 PROCEDURE — 77030002933 HC SUT MCRYL J&J -A: Performed by: ORTHOPAEDIC SURGERY

## 2021-05-05 PROCEDURE — C1713 ANCHOR/SCREW BN/BN,TIS/BN: HCPCS | Performed by: ORTHOPAEDIC SURGERY

## 2021-05-05 PROCEDURE — 77030002934 HC SUT MCRYL J&J -B: Performed by: ORTHOPAEDIC SURGERY

## 2021-05-05 PROCEDURE — 77030020274 HC MISC IMPL ORTHOPEDIC: Performed by: ORTHOPAEDIC SURGERY

## 2021-05-05 PROCEDURE — 77030039425 HC BLD LARYNG TRULITE DISP TELE -A: Performed by: ANESTHESIOLOGY

## 2021-05-05 PROCEDURE — 76210000063 HC OR PH I REC FIRST 0.5 HR: Performed by: ORTHOPAEDIC SURGERY

## 2021-05-05 PROCEDURE — 74011250636 HC RX REV CODE- 250/636: Performed by: ANESTHESIOLOGY

## 2021-05-05 PROCEDURE — 76942 ECHO GUIDE FOR BIOPSY: CPT | Performed by: ORTHOPAEDIC SURGERY

## 2021-05-05 PROCEDURE — 74011250637 HC RX REV CODE- 250/637: Performed by: ANESTHESIOLOGY

## 2021-05-05 PROCEDURE — 77030018673: Performed by: ORTHOPAEDIC SURGERY

## 2021-05-05 PROCEDURE — 77030002922 HC SUT FBRWRE ARTH -B: Performed by: ORTHOPAEDIC SURGERY

## 2021-05-05 PROCEDURE — 74011000250 HC RX REV CODE- 250: Performed by: ORTHOPAEDIC SURGERY

## 2021-05-05 PROCEDURE — 76010000172 HC OR TIME 2.5 TO 3 HR INTENSV-TIER 1: Performed by: ORTHOPAEDIC SURGERY

## 2021-05-05 DEVICE — IMPLANTABLE DEVICE: Type: IMPLANTABLE DEVICE | Site: SHOULDER | Status: FUNCTIONAL

## 2021-05-05 DEVICE — WASHER ORTH DIA7MM TI FOR SM SCR: Type: IMPLANTABLE DEVICE | Site: SHOULDER | Status: FUNCTIONAL

## 2021-05-05 RX ORDER — ONDANSETRON 2 MG/ML
INJECTION INTRAMUSCULAR; INTRAVENOUS AS NEEDED
Status: DISCONTINUED | OUTPATIENT
Start: 2021-05-05 | End: 2021-05-05 | Stop reason: HOSPADM

## 2021-05-05 RX ORDER — LIDOCAINE HYDROCHLORIDE 20 MG/ML
INJECTION, SOLUTION EPIDURAL; INFILTRATION; INTRACAUDAL; PERINEURAL AS NEEDED
Status: DISCONTINUED | OUTPATIENT
Start: 2021-05-05 | End: 2021-05-05 | Stop reason: HOSPADM

## 2021-05-05 RX ORDER — SODIUM CHLORIDE 0.9 % (FLUSH) 0.9 %
5-40 SYRINGE (ML) INJECTION EVERY 8 HOURS
Status: DISCONTINUED | OUTPATIENT
Start: 2021-05-05 | End: 2021-05-05 | Stop reason: HOSPADM

## 2021-05-05 RX ORDER — OXYCODONE AND ACETAMINOPHEN 5; 325 MG/1; MG/1
1 TABLET ORAL AS NEEDED
Status: DISCONTINUED | OUTPATIENT
Start: 2021-05-05 | End: 2021-05-05 | Stop reason: HOSPADM

## 2021-05-05 RX ORDER — SODIUM CHLORIDE 0.9 % (FLUSH) 0.9 %
5-40 SYRINGE (ML) INJECTION AS NEEDED
Status: DISCONTINUED | OUTPATIENT
Start: 2021-05-05 | End: 2021-05-05 | Stop reason: HOSPADM

## 2021-05-05 RX ORDER — ROCURONIUM BROMIDE 10 MG/ML
INJECTION, SOLUTION INTRAVENOUS AS NEEDED
Status: DISCONTINUED | OUTPATIENT
Start: 2021-05-05 | End: 2021-05-05 | Stop reason: HOSPADM

## 2021-05-05 RX ORDER — NEOSTIGMINE METHYLSULFATE 1 MG/ML
INJECTION, SOLUTION INTRAVENOUS AS NEEDED
Status: DISCONTINUED | OUTPATIENT
Start: 2021-05-05 | End: 2021-05-05 | Stop reason: HOSPADM

## 2021-05-05 RX ORDER — ONDANSETRON 2 MG/ML
INJECTION INTRAMUSCULAR; INTRAVENOUS
Status: COMPLETED
Start: 2021-05-05 | End: 2021-05-05

## 2021-05-05 RX ORDER — LIDOCAINE HYDROCHLORIDE 10 MG/ML
0.1 INJECTION INFILTRATION; PERINEURAL AS NEEDED
Status: DISCONTINUED | OUTPATIENT
Start: 2021-05-05 | End: 2021-05-05 | Stop reason: HOSPADM

## 2021-05-05 RX ORDER — PROPOFOL 10 MG/ML
INJECTION, EMULSION INTRAVENOUS AS NEEDED
Status: DISCONTINUED | OUTPATIENT
Start: 2021-05-05 | End: 2021-05-05 | Stop reason: HOSPADM

## 2021-05-05 RX ORDER — FENTANYL CITRATE 50 UG/ML
25 INJECTION, SOLUTION INTRAMUSCULAR; INTRAVENOUS ONCE
Status: COMPLETED | OUTPATIENT
Start: 2021-05-05 | End: 2021-05-05

## 2021-05-05 RX ORDER — FENTANYL CITRATE 50 UG/ML
INJECTION, SOLUTION INTRAMUSCULAR; INTRAVENOUS AS NEEDED
Status: DISCONTINUED | OUTPATIENT
Start: 2021-05-05 | End: 2021-05-05 | Stop reason: HOSPADM

## 2021-05-05 RX ORDER — GLYCOPYRROLATE 0.2 MG/ML
INJECTION INTRAMUSCULAR; INTRAVENOUS AS NEEDED
Status: DISCONTINUED | OUTPATIENT
Start: 2021-05-05 | End: 2021-05-05 | Stop reason: HOSPADM

## 2021-05-05 RX ORDER — ONDANSETRON 2 MG/ML
4 INJECTION INTRAMUSCULAR; INTRAVENOUS
Status: COMPLETED | OUTPATIENT
Start: 2021-05-05 | End: 2021-05-05

## 2021-05-05 RX ORDER — DEXAMETHASONE SODIUM PHOSPHATE 4 MG/ML
INJECTION, SOLUTION INTRA-ARTICULAR; INTRALESIONAL; INTRAMUSCULAR; INTRAVENOUS; SOFT TISSUE AS NEEDED
Status: DISCONTINUED | OUTPATIENT
Start: 2021-05-05 | End: 2021-05-05 | Stop reason: HOSPADM

## 2021-05-05 RX ORDER — MIDAZOLAM HYDROCHLORIDE 1 MG/ML
2 INJECTION, SOLUTION INTRAMUSCULAR; INTRAVENOUS
Status: DISCONTINUED | OUTPATIENT
Start: 2021-05-05 | End: 2021-05-05 | Stop reason: HOSPADM

## 2021-05-05 RX ORDER — EPHEDRINE SULFATE/0.9% NACL/PF 50 MG/5 ML
SYRINGE (ML) INTRAVENOUS AS NEEDED
Status: DISCONTINUED | OUTPATIENT
Start: 2021-05-05 | End: 2021-05-05 | Stop reason: HOSPADM

## 2021-05-05 RX ORDER — BUPIVACAINE HYDROCHLORIDE AND EPINEPHRINE 2.5; 5 MG/ML; UG/ML
INJECTION, SOLUTION EPIDURAL; INFILTRATION; INTRACAUDAL; PERINEURAL
Status: COMPLETED | OUTPATIENT
Start: 2021-05-05 | End: 2021-05-05

## 2021-05-05 RX ORDER — BUPIVACAINE HYDROCHLORIDE AND EPINEPHRINE 5; 5 MG/ML; UG/ML
INJECTION, SOLUTION EPIDURAL; INTRACAUDAL; PERINEURAL
Status: COMPLETED | OUTPATIENT
Start: 2021-05-05 | End: 2021-05-05

## 2021-05-05 RX ORDER — OXYCODONE HYDROCHLORIDE 5 MG/1
5 TABLET ORAL
Status: DISCONTINUED | OUTPATIENT
Start: 2021-05-05 | End: 2021-05-05 | Stop reason: HOSPADM

## 2021-05-05 RX ORDER — TRANEXAMIC ACID 100 MG/ML
INJECTION, SOLUTION INTRAVENOUS AS NEEDED
Status: DISCONTINUED | OUTPATIENT
Start: 2021-05-05 | End: 2021-05-05 | Stop reason: HOSPADM

## 2021-05-05 RX ORDER — SODIUM CHLORIDE, SODIUM LACTATE, POTASSIUM CHLORIDE, CALCIUM CHLORIDE 600; 310; 30; 20 MG/100ML; MG/100ML; MG/100ML; MG/100ML
75 INJECTION, SOLUTION INTRAVENOUS CONTINUOUS
Status: DISCONTINUED | OUTPATIENT
Start: 2021-05-05 | End: 2021-05-05 | Stop reason: HOSPADM

## 2021-05-05 RX ORDER — DIPHENHYDRAMINE HYDROCHLORIDE 50 MG/ML
12.5 INJECTION, SOLUTION INTRAMUSCULAR; INTRAVENOUS
Status: DISCONTINUED | OUTPATIENT
Start: 2021-05-05 | End: 2021-05-05 | Stop reason: HOSPADM

## 2021-05-05 RX ORDER — FAMOTIDINE 20 MG/1
20 TABLET, FILM COATED ORAL ONCE
Status: COMPLETED | OUTPATIENT
Start: 2021-05-05 | End: 2021-05-05

## 2021-05-05 RX ORDER — MIDAZOLAM HYDROCHLORIDE 1 MG/ML
2 INJECTION, SOLUTION INTRAMUSCULAR; INTRAVENOUS ONCE
Status: COMPLETED | OUTPATIENT
Start: 2021-05-05 | End: 2021-05-05

## 2021-05-05 RX ORDER — LIDOCAINE HYDROCHLORIDE 20 MG/ML
INJECTION, SOLUTION EPIDURAL; INFILTRATION; INTRACAUDAL; PERINEURAL
Status: COMPLETED | OUTPATIENT
Start: 2021-05-05 | End: 2021-05-05

## 2021-05-05 RX ORDER — HYDROMORPHONE HYDROCHLORIDE 1 MG/ML
0.5 INJECTION, SOLUTION INTRAMUSCULAR; INTRAVENOUS; SUBCUTANEOUS
Status: DISCONTINUED | OUTPATIENT
Start: 2021-05-05 | End: 2021-05-05 | Stop reason: HOSPADM

## 2021-05-05 RX ORDER — BACITRACIN ZINC 500 UNIT/G
OINTMENT (GRAM) TOPICAL AS NEEDED
Status: DISCONTINUED | OUTPATIENT
Start: 2021-05-05 | End: 2021-05-05 | Stop reason: HOSPADM

## 2021-05-05 RX ORDER — SODIUM CHLORIDE, SODIUM LACTATE, POTASSIUM CHLORIDE, CALCIUM CHLORIDE 600; 310; 30; 20 MG/100ML; MG/100ML; MG/100ML; MG/100ML
100 INJECTION, SOLUTION INTRAVENOUS CONTINUOUS
Status: DISCONTINUED | OUTPATIENT
Start: 2021-05-05 | End: 2021-05-05 | Stop reason: HOSPADM

## 2021-05-05 RX ORDER — SODIUM CHLORIDE 9 MG/ML
50 INJECTION, SOLUTION INTRAVENOUS CONTINUOUS
Status: DISCONTINUED | OUTPATIENT
Start: 2021-05-05 | End: 2021-05-05 | Stop reason: HOSPADM

## 2021-05-05 RX ORDER — CEFAZOLIN SODIUM/WATER 2 G/20 ML
2 SYRINGE (ML) INTRAVENOUS ONCE
Status: COMPLETED | OUTPATIENT
Start: 2021-05-05 | End: 2021-05-05

## 2021-05-05 RX ADMIN — MIDAZOLAM 2 MG: 1 INJECTION INTRAMUSCULAR; INTRAVENOUS at 12:34

## 2021-05-05 RX ADMIN — LIDOCAINE HYDROCHLORIDE 10 ML: 20 INJECTION, SOLUTION EPIDURAL; INFILTRATION; INTRACAUDAL; PERINEURAL at 12:40

## 2021-05-05 RX ADMIN — Medication 10 MG: at 16:48

## 2021-05-05 RX ADMIN — FAMOTIDINE 20 MG: 20 TABLET ORAL at 11:35

## 2021-05-05 RX ADMIN — FENTANYL CITRATE 50 MCG: 50 INJECTION INTRAMUSCULAR; INTRAVENOUS at 16:13

## 2021-05-05 RX ADMIN — GLYCOPYRROLATE 0.3 MG: 0.2 INJECTION, SOLUTION INTRAMUSCULAR; INTRAVENOUS at 16:56

## 2021-05-05 RX ADMIN — TRANEXAMIC ACID 1 G: 100 INJECTION, SOLUTION INTRAVENOUS at 15:00

## 2021-05-05 RX ADMIN — ROCURONIUM BROMIDE 40 MG: 10 INJECTION, SOLUTION INTRAVENOUS at 14:41

## 2021-05-05 RX ADMIN — SODIUM CHLORIDE, SODIUM LACTATE, POTASSIUM CHLORIDE, AND CALCIUM CHLORIDE 1000 ML: 600; 310; 30; 20 INJECTION, SOLUTION INTRAVENOUS at 11:35

## 2021-05-05 RX ADMIN — SODIUM CHLORIDE, SODIUM LACTATE, POTASSIUM CHLORIDE, AND CALCIUM CHLORIDE: 600; 310; 30; 20 INJECTION, SOLUTION INTRAVENOUS at 14:27

## 2021-05-05 RX ADMIN — PROPOFOL 200 MG: 10 INJECTION, EMULSION INTRAVENOUS at 14:41

## 2021-05-05 RX ADMIN — CEFAZOLIN SODIUM 2 G: 10 INJECTION, POWDER, FOR SOLUTION INTRAVENOUS at 15:00

## 2021-05-05 RX ADMIN — ROCURONIUM BROMIDE 10 MG: 10 INJECTION, SOLUTION INTRAVENOUS at 16:09

## 2021-05-05 RX ADMIN — ONDANSETRON 4 MG: 2 INJECTION INTRAMUSCULAR; INTRAVENOUS at 12:34

## 2021-05-05 RX ADMIN — Medication 2 MG: at 16:56

## 2021-05-05 RX ADMIN — LIDOCAINE HYDROCHLORIDE 100 MG: 20 INJECTION, SOLUTION EPIDURAL; INFILTRATION; INTRACAUDAL; PERINEURAL at 14:41

## 2021-05-05 RX ADMIN — FENTANYL CITRATE 25 MCG: 50 INJECTION, SOLUTION INTRAMUSCULAR; INTRAVENOUS at 12:34

## 2021-05-05 RX ADMIN — PROPOFOL 30 MG: 10 INJECTION, EMULSION INTRAVENOUS at 12:34

## 2021-05-05 RX ADMIN — DEXAMETHASONE SODIUM PHOSPHATE 4 MG: 4 INJECTION, SOLUTION INTRAMUSCULAR; INTRAVENOUS at 16:13

## 2021-05-05 RX ADMIN — BUPIVACAINE HYDROCHLORIDE AND EPINEPHRINE BITARTRATE 20 ML: 2.5; .005 INJECTION, SOLUTION EPIDURAL; INFILTRATION; INTRACAUDAL; PERINEURAL at 12:40

## 2021-05-05 RX ADMIN — ONDANSETRON 4 MG: 2 INJECTION INTRAMUSCULAR; INTRAVENOUS at 16:13

## 2021-05-05 RX ADMIN — BUPIVACAINE HYDROCHLORIDE AND EPINEPHRINE 20 ML: 5; 5 INJECTION, SOLUTION EPIDURAL; INTRACAUDAL; PERINEURAL at 12:40

## 2021-05-05 RX ADMIN — PROPOFOL 100 MG: 10 INJECTION, EMULSION INTRAVENOUS at 16:09

## 2021-05-05 NOTE — H&P
Outpatient Surgery History and Physical:  Sincere Barone was seen and examined. CHIEF COMPLAINT:   Right shoulder pain and weakness. PE:     Visit Vitals  /63 (BP 1 Location: Left upper arm, BP Patient Position: Supine)   Pulse 86   Temp 99.2 °F (37.3 °C)   Resp 16   Wt 189 lb (85.7 kg)   SpO2 97%   BMI 25.63 kg/m²       Heart:   Regular rhythm, regular pulses. Lungs:  Are clear, non-labored respirations. Right elbow incision looks stable. Minimal fibrinous exudate. No erythema. Tolerates ROM well. Past Medical History:    Patient Active Problem List    Diagnosis    Traumatic closed displaced fracture of right shoulder with anterior dislocation    Elbow laceration, right, initial encounter    Closed fracture dislocation of right shoulder joint       Surgical History:   Past Surgical History:   Procedure Laterality Date    HX ORTHOPAEDIC  2021    ELBOW DEBRIDEMENT & wash out (Right Elbow) CLOSED REDUCTION UPPER EXTREMITY, Right shoulder disclosed fracture (Right Shoulder    HX ORTHOPAEDIC      scaffoid / R hand/ screw    HX RHINOPLASTY         Social History: Patient  reports that he has never smoked. He has never used smokeless tobacco. He reports previous alcohol use. He reports that he does not use drugs. Family History: History reviewed. No pertinent family history. Allergies: Reviewed per EMR  No Known Allergies    Medications:    No current facility-administered medications on file prior to encounter. Current Outpatient Medications on File Prior to Encounter   Medication Sig    senna-docusate (PERICOLACE) 8.6-50 mg per tablet Take 1 Tab by mouth two (2) times daily as needed for Constipation.  [] cephALEXin (KEFLEX) 500 mg capsule Take 1 Cap by mouth four (4) times daily for 7 days. The surgery is planned for the right shoulder, Open reduction internal fixation of the greater tuberosity. History and physical has been reviewed.  The patient has been examined. There have been no significant clinical changes since the completion of the originally dated History and Physical.  Patient identified by surgeon; surgical site was confirmed by patient and surgeon. The patient is here today for outpatient surgery. I have examined the patient, no changes are noted in the patient's medical status. Necessity for the procedure/care is still present and the history and physical above is current. See the office notes for the full long term history of the problem. Please see the recent office notes for the musculoskeletal examination. We have already discussed the clinical implications of both conservative and operative treatments. They would like to proceed with operative treatment. I talked with them extensively about the risks, benefits, reasonable expectations and expected recovery time including long term need for ambulatory assistance as well as possible complications including but not limited to bleeding, infection, need for hardware removal or exchange, neurovascular injury, stiffness, pain, dislocation, continued problems, DVT, PE, hardware failure, other fracture, need for further surgery, heterotopic ossification, MI and other anesthesia related risks, etc. They have exhausted all other options and wish to proceed. The patient was counseled at length about the risks of karly Covid-19 during their perioperative period and any recovery window from their procedure. The patient was made aware that karly Covid-19  may worsen their prognosis for recovering from their procedure and lend to a higher morbidity and/or mortality risk. All material risks, benefits, and reasonable alternatives including postponing the procedure were discussed. The patient does  wish to proceed with the procedure at this time.       Signed By: Ivory Bass MD     May 5, 2021 1:07 PM

## 2021-05-05 NOTE — ANESTHESIA PROCEDURE NOTES
Peripheral Block    Start time: 5/5/2021 12:34 PM  End time: 5/5/2021 12:40 PM  Performed by: Alivia Simon MD  Authorized by: Alivia Simon MD       Pre-procedure: Indications: at surgeon's request and post-op pain management    Preanesthetic Checklist: patient identified, risks and benefits discussed, site marked, timeout performed, anesthesia consent given and patient being monitored    Timeout Time: 12:34          Block Type:   Block Type:   Interscalene  Laterality:  Right  Monitoring:  Standard ASA monitoring, continuous pulse ox, frequent vital sign checks, heart rate, oxygen and responsive to questions  Injection Technique:  Single shot  Procedures: ultrasound guided and nerve stimulator    Prep: chlorhexidine    Location:  Interscalene  Needle Type:  Stimuplex  Needle Gauge:  22 G  Needle Localization:  Nerve stimulator and ultrasound guidance  Motor Response comment:   Motor Response: minimal motor response >0.4 mA   Medication Injected:  Bupivacaine 0.25% -EPINEPHrine 1:200,000 (SENSORCAINE) mg injection, 20 mL  bupivacaine-EPINEPHrine (PF)(SENSORCAINE) 0.5%-1:200,000 mg injection, 20 mL  lidocaine (XYLOCAINE) Preserv-Free 2% injection, 10 mL  Med Admin Time: 5/5/2021 12:40 PM    Assessment:  Number of attempts:  1  Injection Assessment:  Local visualized surrounding nerve on ultrasound, negative aspiration for CSF, negative aspiration for blood, no paresthesia, no intravascular symptoms, ultrasound image on chart and incremental injection every 5 mL  Patient tolerance:  Patient tolerated the procedure well with no immediate complications

## 2021-05-05 NOTE — OP NOTES
Operative Report    Patient: Mahendra Del Real MRN: [id number]  SSN: [id number]    YOB: 1985  Age: 38 y.o. Sex: male       Date of Surgery: 5/5/2021       Preoperative Diagnosis: Traumatic closed displaced fracture of shoulder with anterior dislocation, right, sequela [S42.91XS]  Closed displaced fracture of greater tuberosity of right humerus with routine healing, subsequent encounter [S42.251D]     Postoperative Diagnosis: Traumatic closed displaced fracture of shoulder with anterior dislocation, right, sequela [S42.91XS]  Closed displaced fracture of greater tuberosity of right humerus with routine healing, subsequent encounter [S42.251D]     Procedure: Procedure(s):  RIGHT OPEN REDUCTION INTERNAL FIXATION OF GREATER TUBEROSITY REMOVAL STITCHES RIGHT ELBOW   ORIF 60850    Surgeon(s) and Role:     Adonis Kearney MD - Primary     Assistant: Georgie WISE, assisted during the procedure. She was necessary for patient positioning, wound closure, and assistance with the major portions of the operation. Her presence decreased the operative time and potential complication rate. Anti-biotics: Ancef 2 grams IV     Anesthesia: General ,Interscalene block    Procedure in Detail:  the patient was given an anesthetic, placed semi sitting, the head was held in a neutral position, the legs were flexed and pillows were placed under the legs. They were then padded and the operative shoulder was prepped with ChloraPrep and draped in the usual fashion. An EUA was performed and noted above. Prior to the beginning of the procedure, a time-out was performed for correct surgical site identification as was marked during the pre-operative meeting. This was confirmed using the written consent and history/physical. Time-out for antibiotic dosing, timing and selection was also performed. The arm was connected to an arm ariza device. Preoperative C arm images were obtained.   The right upper extremity was then prepped and draped in the usual sterile fashion. Prior to the beginning of the procedure, a time-out was performed for correct surgical site identification as was marked during the pre-operative meeting. This was confirmed using the written consent and history/physical. Time-out for antibiotic dosing, timing and selection was also performed. Standard lateral approach to the shoulder was performed after measuring approximately 5-6 cm from the lateral acromial border. Hemostasis was achieved with Bovie cautery. Full skin flaps were developed. Thin deltoid split was performed. Using a ruler a stay suture was placed at approximately 5 cm distal to lateral acromial border. Once the bursal surface was penetrated the bursal tissue was removed with a rongeur as much as possible. The fracture fragments were then identified with digital palpation. Thorough irrigation and debridement of the fracture hematoma at both the undersurface of the fracture fragments as well as at the fracture site was performed. Two #5 FiberWire's were then placed in the rotator cuff at the rotator cuff bone junction. These were used to stay sutures to hold traction. The shoulder was rotated and the fracture was reduced. There were multiple comminuted pieces of the entire large fragment was able to be reduced in good position it was held in place with a 2 mm K wire. Once the arm was brought into evaluate the reduction a 125 K wire was placed in 2 of the larger fragments and a depth gauge was used to get the length of these K wires. 4 mm cannulated screws with washers were then placed over both K wires. The superior screw was a little short so it was removed and an additional length screw was placed.   The stay sutures were felt to also add good support to the rotator cuff and the fragment so they were brought to individual knotless helical anchors that were placed within the shaft after using a spade tip drill and tap for both respective anchors. Irrigation was performed and final C-arm images were noted. The top of the rotator cuff was palpated with a finger and no obvious rotator cuff tearing or abnormality was noted other than the rotator interval.  2-0 Vicryl was then placed within the deltoid from superior to inferior through the deltoid split. Subcutaneous 2-0 Monocryl was then used in a 3 oh strata fix to close the skin. Dermabond was then applied. A sterile dressing was then applied over the top of that as well. Attention was then turned to his elbow laceration site which was thoroughly washed with a wet Ray-Rommel. Several of the medial and lateral sutures were removed. 2 sutures were left in the most central area as there is a little more fibrinous healing noted there. A sterile Aquacel dressing was then applied. An Ace wrap was then applied over the forearm and elbow as well. He was placed back in the sling and taken back to recovery room in stable condition. All counts were correct. Estimated Blood Loss: 150 ml    Post Op plan: PROM as tolerated first 6 weeks. NWB RUE. Full ROM of elbow, wrist and hand. We will bring him back on Monday for wound check of the elbow and to remove the remaining sutures. Implants/Hardware:   Implant Name Type Inv.  Item Serial No.  Lot No. LRB No. Used Action   SCREW BNE L26MM DIA4MM CANC TI SELF DRL ST [place] MOLLY [facility] - [id number]  SCREW BNE L26MM DIA4MM CANC TI SELF DRL ST RASHAD MOLLY LNG  [employer] [id number] Right 1 Implanted   WASHER ORTH DIA7MM TI FOR  SCR - [id number]  WASHER ORTH DIA7MM TI FOR SM SCR  [employer] [id number] Right 2 Implanted   SCREW BNE L34MM DIA4MM CANC TI SELF DRL ST [place] MOLLY LNG - [id number]  SCREW BNE L34MM DIA4MM CANC TI SELF DRL ST RASHAD MOLLY LNG  DE[employer] [id number] Right 1 Implanted   Healicoil 5.0 suture anchor    [employer] [id number] Right 3 Implanted        Signed By: MD Ami Bradley 5, 2021

## 2021-05-05 NOTE — ANESTHESIA PREPROCEDURE EVALUATION
Relevant Problems   No relevant active problems     Relevant Problems   No relevant active problems       Anesthetic History   No history of anesthetic complications            Review of Systems / Medical History  Patient summary reviewed and pertinent labs reviewed    Pulmonary  Within defined limits                 Neuro/Psych   Within defined limits           Cardiovascular                  Exercise tolerance: >4 METS     GI/Hepatic/Renal  Within defined limits              Endo/Other  Within defined limits           Other Findings              Physical Exam    Airway  Mallampati: I  TM Distance: 4 - 6 cm  Neck ROM: normal range of motion   Mouth opening: Normal     Cardiovascular  Regular rate and rhythm,  S1 and S2 normal,  no murmur, click, rub, or gallop  Rhythm: regular  Rate: normal         Dental  No notable dental hx       Pulmonary  Breath sounds clear to auscultation               Abdominal  GI exam deferred       Other Findings            Anesthetic Plan    ASA: 2  Anesthesia type: general      Post-op pain plan if not by surgeon: peripheral nerve block single    Induction: Intravenous  Anesthetic plan and risks discussed with: Patient              Anesthetic History   No history of anesthetic complications            Review of Systems / Medical History  Patient summary reviewed and pertinent labs reviewed    Pulmonary  Within defined limits                 Neuro/Psych   Within defined limits           Cardiovascular                  Exercise tolerance: >4 METS     GI/Hepatic/Renal  Within defined limits              Endo/Other  Within defined limits           Other Findings              Physical Exam    Airway  Mallampati: I  TM Distance: 4 - 6 cm  Neck ROM: normal range of motion   Mouth opening: Normal     Cardiovascular  Regular rate and rhythm,  S1 and S2 normal,  no murmur, click, rub, or gallop  Rhythm: regular  Rate: normal         Dental  No notable dental hx       Pulmonary  Breath sounds clear to auscultation               Abdominal  GI exam deferred       Other Findings            Anesthetic Plan    ASA: 2  Anesthesia type: general      Post-op pain plan if not by surgeon: peripheral nerve block single    Induction: Intravenous  Anesthetic plan and risks discussed with: Patient

## 2021-05-05 NOTE — ANESTHESIA POSTPROCEDURE EVALUATION
Procedure(s):  RIGHT OPEN REDUCTION INTERNAL FIXATION OF GREATER TUBEROSITY REMOVAL STITCHES RIGHT ELBOW.    general    Anesthesia Post Evaluation      Multimodal analgesia: multimodal analgesia used between 6 hours prior to anesthesia start to PACU discharge  Patient location during evaluation: bedside  Patient participation: complete - patient participated  Level of consciousness: awake  Pain management: adequate  Airway patency: patent  Anesthetic complications: no  Cardiovascular status: acceptable and stable  Respiratory status: acceptable and room air  Hydration status: acceptable  Post anesthesia nausea and vomiting:  none  Final Post Anesthesia Temperature Assessment:  Normothermia (36.0-37.5 degrees C)      INITIAL Post-op Vital signs:   Vitals Value Taken Time   /59 05/05/21 1740   Temp 37.2 °C (99 °F) 05/05/21 1717   Pulse 67 05/05/21 1742   Resp 12 05/05/21 1736   SpO2 97 % 05/05/21 1742   Vitals shown include unvalidated device data.

## 2021-05-05 NOTE — DISCHARGE INSTRUCTIONS
Rotator Cuff / Greater Tuberosity Repair Postoperative Instructions    Returning Home    Your pain after surgery will vary depending on the method of anesthesia used and from patient to patient. In the first 24 hours, pain medication should be taken regularly with small amounts of food. During this time, nausea and light-headedness are common and should improve in 2-5 days. Drinking fluids may help. If nausea persists, medicine can be prescribed by calling your doctor at [phone]. Leaving the Outpatient Surgery Center:  As you leave the surgery center, you will be in a sling and swath. The sling will have a pillow with it holding your arm away from your body slightly. Plan on wearing the sling for 4-6 weeks after surgery. Make sure that you have a large shirt or a button up shirt to wear home. For the first week:  1. Sleeping and resting will be more comfortable if you are propped up in bed or have access to a recliner. 2. Ice your shoulder to help manage the pain. 20 minutes of ice every hour as needed. 3. You may come out of the sling 3-5x/day to do elbow, wrist and hand range of motion, and other home exercises (listed further down in - Home Excersizes) so your other joints do not get stiff. Just do not activate or use your shoulder muscles! 4. Sleep with your sling on. Sling Use  You will be in the sling for 4 to 6 weeks. You may take the sling off to do your home exercises or physical therapy, or shower, but you need to wear the sling at all other times, even SLEEPING. To put the sling on:    1. Make sure the pillow of the sling is snug against your side and that your hand and elbow are parallel to the floor. 2. One strap will go around your waist and connect to the pillow. Care of Your Incisions  1. Incisions and stitches are often checked/removed 6 to 10 days after surgery. 2. Moderate bleeding may occur at the incision sites. This should decrease quickly over time.   3. Leave the dressings from surgery in place for 48 to 72 hours. The bulky dressings may be removed and replaced with fresh gauze at that time, but leave on the small tape strips on the incision sites. 4. Watch the wound for increasing redness, tenderness, swelling, and pus drainage daily. These can be early signs of infection. If you notice any of these signs of infection please call [phone]. A mild fever during the first few days after surgery is not uncommon. This often occurs and can be treated with deep breathing, coughing to clear the lungs, and walking. However, fevers, increasing pain, and swelling at the incisions should be reported immediately. Showering:   Until your sutures are removed, you should consider covering your shoulder in saran wrap with tape for showering.  A plastic chair in your shower will allow you to sit.  Sponge bathing is also an option.  In general, after your sutures have been removed you may allow your incisions to get wet in the shower. Post-Operative Pain Management  ANESTHESIA: You will meet with an anesthesiologist on the day of surgery to discuss your anesthesia. You will have general anesthesia and often will have a nerve block. MEDICATIONS: You will be given a prescription for medications. Please take them as directed on the label and with food.  Certain pain medications may contain Tylenol(Acetaminophen). It is important not to take any additional Tylenol while on these pain medications.  Do not mix your pain medications with alcohol.  You should not drive while taking pain medications as they increase your liability and delay your responses.  Your physician will most likely talk with you about taking Aspirin 81 mg or 325 mg (ECASA) once daily for two-three weeks after surgery. This is done in order to help minimize the risk for a blood clot from developing, which is a possible complication after any surgery.  If you have Ulcers or stomach irritation do not take this medicine. Be careful taking aspirin and other NSAID's as it can increase your risk of gastric irritation and other side effects.  If you have any questions or concerns regarding your medications, please call the office. · Common side effects of the narcotics include nausea, vomiting, drowsiness, constipation, and difficulty urinating. If you experience constipation, drink lots of water/Gatorade, avoid soda and diet drinks. Eat plenty of fiber. You may take a stool softener: Colace 100mg twice a day for the first week. For severe constipation use magnesium citrate, one 8 oz bottle. All can be bought at the pharmacy. Diet and General Conditioning  Aerobic conditioning and diet are both very important after surgery. In general, we recommend that you make sure to avoid skipping meals, eat a balanced diet including regular portions of fruits and vegetables, and avoid relying on fast foods while you are recovering from surgery. Also, consider taking a daily multi-vitamin. Participate in some form of aerobic conditioning after surgery. Speak with your physical therapist or call our office to determine an appropriate form of exercise after surgery. Initially, your exercise will need to be modified after surgery. Follow Up Visits  Doctor  Plan on seeing your surgeon at 1 week, 1 month, 3 months, and 6 months after surgery. If your shoulder does not progress as planned, you are welcome to schedule additional visits. There is usually no charge for surgery related visits 90 days following surgery. You will receive a bill for any x-rays or special equipment (such as a brace). Home Exercises  Do these exercises 3-5 times per day after surgery    1. Elbow, Wrist, and Hand Motion- have someone help you remove your sling. Let your arm dangle at your side. Using your other hand to help, bend and straighten your elbow.  Also, turn your hand back and forth and move your fingers, wrist, and hand. Do this for 3-5 minutes each time that you do it. 2. Pendulums- Have someone help you remove your sling. Let your arm dangle straight at your side. Brace yourself with your non-operated arm on a table or chair. Lean forward slightly and relax your shoulder. Gravity will pull your arm away from your body. Let your arm hang and when you feel comfortable use your body to slightly swing your arm. Slowly stand up. Do this 3 times for up to 1 minute each time. DO NOT ACTIVATE YOUR SHOULDER MUSCLES. 3. Scapular Squeezes- You may leave your sling on for this exercise. Sit up straight. Roll your shoulders back and squeeze your shoulder blades together. Do this 30 times for each set of exercises that you do. 4. Scapular Shrugs- You may leave your sling on for this exercise as well. Sit up straight and do a scapular squeeze, while you hold the squeeze, shrug your shoulders. Do this 15 times for each set of exercises that you do throughout the day. If you call the office please have us paged instead of leaving a voice mail so that we can immediately take care of your needs. [address] [facility]                 Phone [phone]  [place] [address]                 Fax [phone]                             [address]      After general anesthesia or intravenous sedation, for 24 hours or while taking prescription Narcotics:  · Limit your activities  · A responsible adult needs to be with you for the next 24 hours  · Do not drive and operate hazardous machinery  · Do not make important personal or business decisions  · Do not drink alcoholic beverages  · If you have not urinated within 8 hours after discharge, and you are experiencing discomfort from urinary retention, please go to the nearest ED. · If you have sleep apnea and have a CPAP machine, please use it for all naps and sleeping.   · Please use caution when taking narcotics and any of your home medications that may cause drowsiness. *  Please give a list of your current medications to your Primary Care Provider. *  Please update this list whenever your medications are discontinued, doses are      changed, or new medications (including over-the-counter products) are added. *  Please carry medication information at all times in case of emergency situations. These are general instructions for a healthy lifestyle:  No smoking/ No tobacco products/ Avoid exposure to second hand smoke  Surgeon General's Warning:  Quitting smoking now greatly reduces serious risk to your health. Obesity, smoking, and sedentary lifestyle greatly increases your risk for illness  A healthy diet, regular physical exercise & weight monitoring are important for maintaining a healthy lifestyle    You may be retaining fluid if you have a history of heart failure or if you experience any of the following symptoms:  Weight gain of 3 pounds or more overnight or 5 pounds in a week, increased swelling in our hands or feet or shortness of breath while lying flat in bed. Please call your doctor as soon as you notice any of these symptoms; do not wait until your next office visit.

## 2021-05-05 NOTE — BRIEF OP NOTE
Brief Postoperative Note    Patient: Isabella Plaza  YOB: 1985  MRN: [id number]    Date of Procedure: 5/5/2021     Pre-Op Diagnosis: Traumatic closed displaced fracture of shoulder with anterior dislocation, right, sequela [S42.91XS]  Closed displaced fracture of greater tuberosity of right humerus with routine healing, subsequent encounter [S42.251D]    Post-Op Diagnosis: Same as preoperative diagnosis. Procedure(s):  RIGHT OPEN REDUCTION INTERNAL FIXATION OF GREATER TUBEROSITY REMOVAL STITCHES RIGHT ELBOW    Surgeon(s):  Obie Epps MD    Surgical Assistant: Physician Assistant: FRANDY Lee    Anesthesia: General     Estimated Blood Loss (mL): 831 mL    Complications: None    Specimens: * No specimens in log *     Implants:   Implant Name Type Inv.  Item Serial No.  Lot No. LRB No. Used Action   SCREW BNE L26MM DIA4MM CANC TI SELF DRL ST [employer]LLY [employer]NG - [id number]  SCREW BNE L26MM DIA4MM CANC TI SELF DRL ST RASHAD [employer]  [employer]_ [id number] Right 1 Implanted   WASHER ORTH DIA7MM TI FOR SM SCR - [id number]  WASHER ORTH DIA7MM TI FOR SM SCR  DEPU[employer]_WD [id number] Right 2 Implanted   SCREW BNE L34MM DIA4MM CANC TI SELF DRL ST [employer]NG - [id number]  SCREW BNE L34MM DIA4MM CANC TI SELF DRL ST RASHAD MOLLY LNG  DEPU[employer]_ [id number] Right 1 Implanted   Healicoil 5.0 suture anchor    [employer] [id number] Right 3 Implanted       Drains: * No LDAs found *    Findings: See operative note    Electronically Signed by Rick Ramírez MD on 5/5/2021 at 5:04 PM

## 2021-05-05 NOTE — PERIOP NOTES
Pt's wife not here. She has 3 young children and had to be home with them. Called her at this time and gave her discharge instructions. Also gave pt the instructions. Both voice understanding of instructions.

## 2022-03-18 PROBLEM — S42.91XA: Status: ACTIVE | Noted: 2021-04-25

## 2022-03-18 PROBLEM — S51.011A ELBOW LACERATION, RIGHT, INITIAL ENCOUNTER: Status: ACTIVE | Noted: 2021-04-25

## 2022-03-20 PROBLEM — S42.91XA TRAUMATIC CLOSED DISPLACED FRACTURE OF RIGHT SHOULDER WITH ANTERIOR DISLOCATION: Status: ACTIVE | Noted: 2021-04-25

## 2023-12-15 ENCOUNTER — OFFICE VISIT (OUTPATIENT)
Dept: ORTHOPEDIC SURGERY | Age: 38
End: 2023-12-15

## 2023-12-15 DIAGNOSIS — S89.91XA INJURY OF RIGHT KNEE, INITIAL ENCOUNTER: ICD-10-CM

## 2023-12-15 DIAGNOSIS — M25.561 RIGHT KNEE PAIN, UNSPECIFIED CHRONICITY: Primary | ICD-10-CM

## 2023-12-15 NOTE — PROGRESS NOTES
Name: Omar Kussmaul  YOB: 1985  Gender: male  MRN: [id number]    CC: Right knee injury    HPI:   First week November 2023: Right knee injury playing hockey  11/29/2023: [facility] ED  12/15/2023: Initial visit: Right knee injury/pain    ROS/Meds/PSH/PMH/FH/SH: reviewed today    Tobacco:  reports that he has never smoked. He has never used smokeless tobacco.     Physical Examination:  Patient appears to be alert and oriented with acceptable appearance. No obvious distress or SOB  CV: appears to have acceptable vascular color and capillary refill  Neuro: appears to have mostly intact light touch sensation   Skin: No gross knee effusion  MS: Standing: Plantigrade: Gait full  Right = anterior knee pain with suspected anterior drawer  Right = full knee motion; 5/5 strength; no crepitance    XR: Right: NWB 4 views knee taken 11/29/2023 at [facility]: Anterior knee soft tissue density; questionable tibial spine injury  XR Impression:  As above      Reviewed Test/Records/Documents:   11/29/2023: [facility] ED: Reflects 1week-old injury after falling playing hockey: Reports falling directly on his knees. Reflects no fracture:  11/29/2023: [facility] ED: 4 views right knee: Radiology impression: No acute osseous findings: No joint effusion: No radiographic evidence of acute fracture or traumatic malalignment    Injection: No indication    Assessment:    Right knee injury; questionable tibial spine injury with ACL    Plan:   The patient and I discussed the above assessment. We explored treatment options.      He reports no locking catching or giving way  He has pain with forced posterior knee motion and maneuver that stresses his ACL    Advanced medical imaging: Right knee MRI scan: Right knee injury early November 2023: Plain film radiographs with no fracture: Assess for tibial spine injury; ACL tear/sprain; PCL tear/sprain    DME: He does not feel he needs a brace  We discussed care and

## 2024-01-09 ENCOUNTER — TELEPHONE (OUTPATIENT)
Dept: ORTHOPEDIC SURGERY | Age: 39
End: 2024-01-09

## 2024-01-09 ENCOUNTER — OFFICE VISIT (OUTPATIENT)
Dept: ORTHOPEDIC SURGERY | Age: 39
End: 2024-01-09
Payer: COMMERCIAL

## 2024-01-09 DIAGNOSIS — S89.92XA PCL INJURY, LEFT, INITIAL ENCOUNTER: ICD-10-CM

## 2024-01-09 DIAGNOSIS — M25.561 ACUTE PAIN OF RIGHT KNEE: Primary | ICD-10-CM

## 2024-01-09 PROCEDURE — 99204 OFFICE O/P NEW MOD 45 MIN: CPT | Performed by: ORTHOPAEDIC SURGERY

## 2024-01-09 NOTE — TELEPHONE ENCOUNTER
Spoke with pt and let him know that I have spoke to Dr. Tristan and he recommends that he wait to play hockey for another month, and get the brace that we ordered. I let him know that that is kind of a guideline as if it was a month and he went back to skating and it was hurting then I would say that he wasn't quite ready. I let him know that he can  at this time without a problem. He expressed understanding and thanked me for calling.

## 2024-01-09 NOTE — TELEPHONE ENCOUNTER
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 Please give him a call.

## 2024-01-09 NOTE — PROGRESS NOTES
Name: Liban Farmer  YOB: 1985  Gender: male  MRN: [id number]    CC:   Chief Complaint   Patient presents with    Follow-up     MRI results right knee   Right  KNEE PAIN; STIFFNESS     HPI: Known patient to the practice previously seen 2 years ago for greater tuberosity fracture presents today for right knee pain.  Patient acquired injury while playing hockey in November.  Patient saw Dr. Torres who was sent for advanced imaging..  Patient states he has full motion of the knee.  The patient states that the only time really bothers him is when he goes into the full flexion of the knee.  Patient denies swelling, denies feelings of instability.  He states that he has been participating in hockey and doing most of his ADLs.  Only has symptoms with falling onto the knee with hockey or hyperflexion    No Known Allergies  No past medical history on file.  Past Surgical History:   Procedure Laterality Date    ORTHOPEDIC SURGERY      scaffoid / R hand/ screw    ORTHOPEDIC SURGERY  04/2021    ELBOW DEBRIDEMENT & wash out (Right Elbow) CLOSED REDUCTION UPPER EXTREMITY, Right shoulder disclosed fracture (Right Shoulder    RHINOPLASTY       No family history on file.  Social History     Socioeconomic History    Marital status:      Spouse name: Not on file    Number of children: Not on file    Years of education: Not on file    Highest education level: Not on file   Occupational History    Not on file   Tobacco Use    Smoking status: Never    Smokeless tobacco: Never   Substance and Sexual Activity    Alcohol use: Not Currently    Drug use: Never    Sexual activity: Not on file   Other Topics Concern    Not on file   Social History Narrative    Not on file     Social Determinants of Health     Financial Resource Strain: Not on file   Food Insecurity: Not on file   Transportation Needs: Not on file   Physical Activity: Not on file   Stress: Not on file   Social Connections: Not on file   Intimate

## 2024-01-17 ENCOUNTER — EVALUATION (OUTPATIENT)
Age: 39
End: 2024-01-17

## 2024-01-17 DIAGNOSIS — M62.81 MUSCLE WEAKNESS: ICD-10-CM

## 2024-01-17 DIAGNOSIS — S89.92XA PCL INJURY, LEFT, INITIAL ENCOUNTER: ICD-10-CM

## 2024-01-17 DIAGNOSIS — M25.561 ACUTE PAIN OF RIGHT KNEE: Primary | ICD-10-CM

## 2024-01-17 DIAGNOSIS — Z74.09 IMPAIRED FUNCTIONAL MOBILITY, BALANCE, GAIT, AND ENDURANCE: ICD-10-CM

## 2024-01-17 DIAGNOSIS — S89.91XS: ICD-10-CM

## 2024-01-17 NOTE — PROGRESS NOTES
[facility]  [address] [place] [address]  Dept: [phone]      Physical Therapy Initial Assessment     Referring MD: SHENA Tristan MD  Diagnosis:     ICD-10-CM    1. Acute pain of right knee  M25.561       2. Muscle weakness  M62.81       3. Impaired functional mobility, balance, gait, and endurance  Z74.09       4. Injury of posterior cruciate ligament of right knee, sequela  S89.91XS          Therapy precautions: PCL strain - avoid hamstring strengthening  Co-morbidities affecting plan of care: none    Total Timed Codes: 25 min, Total Treatment Time: 45 min Modifier needed: No    Episode visit count:  1     PERTINENT MEDICAL HISTORY     Past medical and surgical history:   History reviewed. No pertinent past medical history.   Past Surgical History:   Procedure Laterality Date    ORTHOPEDIC SURGERY      scaffoid / R hand/ screw    ORTHOPEDIC SURGERY  04/2021    ELBOW DEBRIDEMENT & wash out (Right Elbow) CLOSED REDUCTION UPPER EXTREMITY, Right shoulder disclosed fracture (Right Shoulder    RHINOPLASTY       Medications: reviewed in chart   Allergies: No Known Allergies     Diagnostic exams (per chart review): MRI R knee 1/5/24  Impression  1. No evidence for meniscal or ligamentous tear.  2. 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     Dr. Tristan independently reviewed the MRI with the patient in the room.  Discussed that MD thinks pt has a small radial free edge medial meniscus abnormality as well as a low-grade PCL strain in the mid substance of not femoral attachment.    SUBJECTIVE     Chief complaints/history of injury:    Date symptoms began: First week November 2023  Nature of condition: Recent onset (initial onset within last 3 months)  Primary cause of current episode: Traumatic  How did symptoms start: Pt fell directly on knees while

## 2024-01-26 ENCOUNTER — TREATMENT (OUTPATIENT)
Age: 39
End: 2024-01-26
Payer: COMMERCIAL

## 2024-01-26 DIAGNOSIS — M62.81 MUSCLE WEAKNESS: ICD-10-CM

## 2024-01-26 DIAGNOSIS — S89.91XS: ICD-10-CM

## 2024-01-26 DIAGNOSIS — Z74.09 IMPAIRED FUNCTIONAL MOBILITY, BALANCE, GAIT, AND ENDURANCE: ICD-10-CM

## 2024-01-26 DIAGNOSIS — M25.561 ACUTE PAIN OF RIGHT KNEE: Primary | ICD-10-CM

## 2024-01-26 PROCEDURE — 97110 THERAPEUTIC EXERCISES: CPT | Performed by: PHYSICAL THERAPIST

## 2024-01-26 NOTE — PROGRESS NOTES
[facility]  [address] [place] [address]  Dept: [phone]      Physical Therapy Daily Note     Referring MD: SHENA Tristan MD  Diagnosis:     ICD-10-CM    1. Acute pain of right knee  M25.561       2. Muscle weakness  M62.81       3. Impaired functional mobility, balance, gait, and endurance  Z74.09       4. Injury of posterior cruciate ligament of right knee, sequela  S89.91XS          Therapy precautions: PCL strain - avoid hamstring strengthening  Co-morbidities affecting plan of care: none  Chief complaints/history of injury: Pt fell directly on knees while playing hockey ~11/1/23. He noted severe R knee pain the next day that seemed to improve over time. Pt initially seen at [facility] 11/29/23 due to lingering symptoms. X-rays showed no obvious bony deformity. Pt followed up with Dr. Torres 12/15/23 and was referred for MRI which showed a small radial free edge medial meniscus abnormality as well as a low-grade PCL strain in the mid substance of not femoral attachment. He then followed up with Dr. Tristan 1/9/24 noting pain only with full flexion of the knee or falling onto the knee while playing hockey. Pt referred to PT and reports no improvement over the past few weeks.  Describe current symptoms: Pain with kneeling, end range flexion- push tibia backwards, quick IR/ER of hip  Patient Stated Goals: kneel to bathe kids, return to all activity including hockey/ motor cross    Total Timed Procedure Codes: 45 min, Total Time: 49 min Modifier needed: No  Episode visit count:  2     SUBJECTIVE     Pt reports minimal R knee pain since last session.  Pt briefly kneeled without  ralizing what he was doing with no increase in pain.     Medications: no changes since last session    OBJECTIVE     Findings: Tindeq quadriceps strength at 70 degrees flexion R 199.5#, L 189.1#  LEFS: 77/80  Hamstring strength: continued pain with resisted hamstring curls (light manual

## 2024-01-31 ENCOUNTER — TREATMENT (OUTPATIENT)
Age: 39
End: 2024-01-31
Payer: COMMERCIAL

## 2024-01-31 DIAGNOSIS — S89.91XS: ICD-10-CM

## 2024-01-31 DIAGNOSIS — Z74.09 IMPAIRED FUNCTIONAL MOBILITY, BALANCE, GAIT, AND ENDURANCE: ICD-10-CM

## 2024-01-31 DIAGNOSIS — M25.561 ACUTE PAIN OF RIGHT KNEE: Primary | ICD-10-CM

## 2024-01-31 DIAGNOSIS — M62.81 MUSCLE WEAKNESS: ICD-10-CM

## 2024-01-31 PROCEDURE — 97140 MANUAL THERAPY 1/> REGIONS: CPT | Performed by: PHYSICAL THERAPIST

## 2024-01-31 PROCEDURE — 97110 THERAPEUTIC EXERCISES: CPT | Performed by: PHYSICAL THERAPIST

## 2024-01-31 NOTE — PROGRESS NOTES
[facility]  [address] [facility] [address]  Dept: [phone]      Physical Therapy Daily Note     Referring MD: SHENA Tristan MD  Diagnosis:     ICD-10-CM    1. Acute pain of right knee  M25.561       2. Muscle weakness  M62.81       3. Impaired functional mobility, balance, gait, and endurance  Z74.09       4. Injury of posterior cruciate ligament of right knee, sequela  S89.91XS          Therapy precautions: PCL strain - avoid hamstring strengthening  Co-morbidities affecting plan of care: none  Chief complaints/history of injury: Pt fell directly on knees while playing hockey ~11/1/23. He noted severe R knee pain the next day that seemed to improve over time. Pt initially seen at [facility] 11/29/23 due to lingering symptoms. X-rays showed no obvious bony deformity. Pt followed up with Dr. Torres 12/15/23 and was referred for MRI which showed a small radial free edge medial meniscus abnormality as well as a low-grade PCL strain in the mid substance of not femoral attachment. He then followed up with Dr. Tristan 1/9/24 noting pain only with full flexion of the knee or falling onto the knee while playing hockey. Pt referred to PT and reports no improvement over the past few weeks.  Describe current symptoms: Pain with kneeling, end range flexion- push tibia backwards, quick IR/ER of hip  Patient Stated Goals: kneel to bathe kids, return to all activity including hockey/ motor cross    Total Timed Procedure Codes: 55 min, Total Time: 55 min Modifier needed: No  Episode visit count:  3     SUBJECTIVE     Pt reports minimal R knee pain since last session.  He does note some feeling of knee instability when moving leg around in bed and with medial/lateral motions.     Medications: no changes since last session    OBJECTIVE     R knee varus/valgus stress test: negative    Treatment provided today:  Therapeutic exercise (24030) x 45 min to develop ROM, strength,

## 2024-02-13 ENCOUNTER — TREATMENT (OUTPATIENT)
Age: 39
End: 2024-02-13
Payer: COMMERCIAL

## 2024-02-13 ENCOUNTER — OFFICE VISIT (OUTPATIENT)
Dept: ORTHOPEDIC SURGERY | Age: 39
End: 2024-02-13
Payer: COMMERCIAL

## 2024-02-13 DIAGNOSIS — Z74.09 IMPAIRED FUNCTIONAL MOBILITY, BALANCE, GAIT, AND ENDURANCE: ICD-10-CM

## 2024-02-13 DIAGNOSIS — M25.561 ACUTE PAIN OF RIGHT KNEE: Primary | ICD-10-CM

## 2024-02-13 DIAGNOSIS — S89.92XD PCL INJURY, LEFT, SUBSEQUENT ENCOUNTER: ICD-10-CM

## 2024-02-13 DIAGNOSIS — S89.91XS: ICD-10-CM

## 2024-02-13 DIAGNOSIS — M62.81 MUSCLE WEAKNESS: ICD-10-CM

## 2024-02-13 PROCEDURE — 99213 OFFICE O/P EST LOW 20 MIN: CPT | Performed by: ORTHOPAEDIC SURGERY

## 2024-02-13 PROCEDURE — 97110 THERAPEUTIC EXERCISES: CPT | Performed by: PHYSICAL THERAPIST

## 2024-02-13 PROCEDURE — 97140 MANUAL THERAPY 1/> REGIONS: CPT | Performed by: PHYSICAL THERAPIST

## 2024-02-13 NOTE — PROGRESS NOTES
"Cough, dizziness when coughing, "short winded" since yesterday, worsening today.  Last dialysis Thursday, was supposed to go yesterday but did not go.  " [facility]  [address] [facility] [address]  Dept: [phone]      Physical Therapy Progress Report     Referring MD: SHENA Tristan MD  Diagnosis:     ICD-10-CM    1. Acute pain of right knee  M25.561       2. Muscle weakness  M62.81       3. Impaired functional mobility, balance, gait, and endurance  Z74.09       4. Injury of posterior cruciate ligament of right knee, sequela  S89.91XS          Therapy precautions: PCL strain - avoid hamstring strengthening  Co-morbidities affecting plan of care: none  Chief complaints/history of injury: Pt fell directly on knees while playing hockey ~11/1/23. He noted severe R knee pain the next day that seemed to improve over time. Pt initially seen at [facility] 11/29/23 due to lingering symptoms. X-rays showed no obvious bony deformity. Pt followed up with Dr. Torres 12/15/23 and was referred for MRI which showed a small radial free edge medial meniscus abnormality as well as a low-grade PCL strain in the mid substance of not femoral attachment. He then followed up with Dr. Tristan 1/9/24 noting pain only with full flexion of the knee or falling onto the knee while playing hockey. Pt referred to PT and reports no improvement over the past few weeks.  Describe current symptoms: Pain with kneeling, end range flexion- push tibia backwards, quick IR/ER of hip  Patient Stated Goals: kneel to bathe kids, return to all activity including hockey/ motor cross    Total Timed Procedure Codes: 51 min, Total Time: 51 min Modifier needed: No  Episode visit count:  4     SUBJECTIVE     Pt reports that knee is improving and he can stand on the knee better. He continues to note feeling of instability with certain movements. Pt will follow up with MD after appt today. Pt able don socks/shoes without difficulty.     Medications: no changes since last session    OBJECTIVE   6-inch step down: good knee control    Treatment provided

## 2024-02-13 NOTE — PROGRESS NOTES
Name: Liban Farmer  YOB: 1985  Gender: male  MRN: [id number]    CC:   Chief Complaint   Patient presents with    Knee Pain     Recheck right knee        HPI: Patient presents for follow-up of the right knee PCL strain.  At the patient's last visit he was given physical therapy and a functional knee brace.  Doing well.  As questions about when he can return to hockey.  Recall:  Patient acquired injury while playing hockey in November.  Patient saw Dr. Torres who was sent for advanced imaging..  Patient states he has full motion of the knee.  The patient states that the only time really bothers him is when he goes into the full flexion of the knee.  Patient denies swelling, denies feelings of instability.  He states that he has been participating in hockey and doing most of his ADLs.  Only has symptoms with falling onto the knee with hockey or hyperflexion     No Known Allergies  No past medical history on file.  Past Surgical History:   Procedure Laterality Date    ORTHOPEDIC SURGERY      scaffoid / R hand/ screw    ORTHOPEDIC SURGERY  04/2021    ELBOW DEBRIDEMENT & wash out (Right Elbow) CLOSED REDUCTION UPPER EXTREMITY, Right shoulder disclosed fracture (Right Shoulder    RHINOPLASTY       No family history on file.  Social History     Socioeconomic History    Marital status:      Spouse name: Not on file    Number of children: Not on file    Years of education: Not on file    Highest education level: Not on file   Occupational History    Not on file   Tobacco Use    Smoking status: Never    Smokeless tobacco: Never   Substance and Sexual Activity    Alcohol use: Not Currently    Drug use: Never    Sexual activity: Not on file   Other Topics Concern    Not on file   Social History Narrative    Not on file     Social Determinants of Health     Financial Resource Strain: Not on file   Food Insecurity: Not on file   Transportation Needs: Not on file   Physical Activity: Not on file

## 2024-02-16 ENCOUNTER — CLINICAL DOCUMENTATION (OUTPATIENT)
Age: 39
End: 2024-02-16

## 2024-02-21 ENCOUNTER — TREATMENT (OUTPATIENT)
Age: 39
End: 2024-02-21

## 2024-02-21 DIAGNOSIS — M62.81 MUSCLE WEAKNESS: ICD-10-CM

## 2024-02-21 DIAGNOSIS — M25.561 ACUTE PAIN OF RIGHT KNEE: Primary | ICD-10-CM

## 2024-02-21 DIAGNOSIS — S89.91XS: ICD-10-CM

## 2024-02-21 DIAGNOSIS — Z74.09 IMPAIRED FUNCTIONAL MOBILITY, BALANCE, GAIT, AND ENDURANCE: ICD-10-CM

## 2024-02-21 NOTE — PROGRESS NOTES
[facility]  [address] [facility] [address]  Dept: [phone]      Physical Therapy Daily Note     Referring MD: SHENA Tristan MD  Diagnosis:     ICD-10-CM    1. Acute pain of right knee  M25.561       2. Muscle weakness  M62.81       3. Impaired functional mobility, balance, gait, and endurance  Z74.09       4. Injury of posterior cruciate ligament of right knee, sequela  S89.91XS          Therapy precautions: PCL strain - avoid hamstring strengthening  Co-morbidities affecting plan of care: none  Chief complaints/history of injury: Pt fell directly on knees while playing hockey ~11/1/23. He noted severe R knee pain the next day that seemed to improve over time. Pt initially seen at [facility] 11/29/23 due to lingering symptoms. X-rays showed no obvious bony deformity. Pt followed up with Dr. Torres 12/15/23 and was referred for MRI which showed a small radial free edge medial meniscus abnormality as well as a low-grade PCL strain in the mid substance of not femoral attachment. He then followed up with Dr. Tristan 1/9/24 noting pain only with full flexion of the knee or falling onto the knee while playing hockey. Pt referred to PT and reports no improvement over the past few weeks.  Describe current symptoms: Pain with kneeling, end range flexion- push tibia backwards, quick IR/ER of hip  Patient Stated Goals: kneel to bathe kids, return to all activity including hockey/ motor cross    Total Timed Procedure Codes: 60 min, Total Time: 65 min Modifier needed: No  Episode visit count:  5     SUBJECTIVE     Pt saw Dr. Tristan and would like to return to hockey. He was told that he needed to pass return to sport testing first.     Medications: no changes since last session    OBJECTIVE     Isometric strength (lbs):     right left LSI   Knee extension 228.0  240.9 95%   Knee flexion 38.3 65.0 59%   HS/Quad Ratio 17% 27%        Y Balance Test (Lower quarter)    Limb length

## 2024-03-06 ENCOUNTER — TREATMENT (OUTPATIENT)
Age: 39
End: 2024-03-06

## 2024-03-06 DIAGNOSIS — S89.91XS: ICD-10-CM

## 2024-03-06 DIAGNOSIS — Z74.09 IMPAIRED FUNCTIONAL MOBILITY, BALANCE, GAIT, AND ENDURANCE: ICD-10-CM

## 2024-03-06 DIAGNOSIS — M62.81 MUSCLE WEAKNESS: ICD-10-CM

## 2024-03-06 DIAGNOSIS — M25.561 ACUTE PAIN OF RIGHT KNEE: Primary | ICD-10-CM

## 2024-03-06 NOTE — PROGRESS NOTES
[facility]  [address] [place] [address]  Dept: [phone]      Physical Therapy Daily Note     Referring MD: SHENA Tristan MD  Diagnosis:     ICD-10-CM    1. Acute pain of right knee  M25.561       2. Muscle weakness  M62.81       3. Impaired functional mobility, balance, gait, and endurance  Z74.09       4. Injury of posterior cruciate ligament of right knee, sequela  S89.91XS          Therapy precautions: PCL strain - avoid hamstring strengthening  Co-morbidities affecting plan of care: none  Chief complaints/history of injury: Pt fell directly on knees while playing hockey ~11/1/23. He noted severe R knee pain the next day that seemed to improve over time. Pt initially seen at [facility] 11/29/23 due to lingering symptoms. X-rays showed no obvious bony deformity. Pt followed up with Dr. Torres 12/15/23 and was referred for MRI which showed a small radial free edge medial meniscus abnormality as well as a low-grade PCL strain in the mid substance of not femoral attachment. He then followed up with Dr. Tristan 1/9/24 noting pain only with full flexion of the knee or falling onto the knee while playing hockey. Pt referred to PT and reports no improvement over the past few weeks.  Describe current symptoms: Pain with kneeling, end range flexion- push tibia backwards, quick IR/ER of hip  Patient Stated Goals: kneel to bathe kids, return to all activity including hockey/ motor cross    Total Timed Procedure Codes: 55 min, Total Time: 60 min Modifier needed: No  Episode visit count:  6     SUBJECTIVE     Pt reports that he fell over a root in his yard, landing on the R knee and also kicked foot out to side to stop a dropped item (flexion and hip IR). Both incidents caused intense knee pain that lasted ~ 5 minutes and then returned to baseline.     Medications: no changes since last session    OBJECTIVE     Isometric strength (lbs):    2/21/24 right left LSI   Knee

## (undated) DEVICE — 3M™ IOBAN™ 2 ANTIMICROBIAL INCISE DRAPE 6650EZ: Brand: IOBAN™ 2

## (undated) DEVICE — SUTURE PDS II SZ 0 L27IN ABSRB VLT L26MM CT-2 1/2 CIR Z334H

## (undated) DEVICE — SYRINGE IRRIG 60ML SFT PLIABLE BLB EZ TO GRP 1 HND USE W/

## (undated) DEVICE — SUTURE ETHLN SZ 2-0 L18IN NONABSORBABLE BLK L26MM PS 3/8 585H

## (undated) DEVICE — REM POLYHESIVE ADULT PATIENT RETURN ELECTRODE: Brand: VALLEYLAB

## (undated) DEVICE — BIT DRL L160MM DIA2.7MM CANN QUIK CPL ADJ STP REUSE FOR

## (undated) DEVICE — SOLUTION IV 1000ML 0.9% SOD CHL

## (undated) DEVICE — PREP SKN CHLRAPRP APL 26ML STR --

## (undated) DEVICE — SUTURE VCRL SZ 2-0 L27IN ABSRB UD L26MM CT-2 1/2 CIR J269H

## (undated) DEVICE — GARMENT,MEDLINE,DVT,INT,CALF,MED, GEN2: Brand: MEDLINE

## (undated) DEVICE — SUTURE MCRYL SZ 2-0 L27IN ABSRB UD CP-1 1 L36MM 1/2 CIR REV Y266H

## (undated) DEVICE — DRAPE,SHOULDER,BEACH CHAIR,STERILE: Brand: MEDLINE

## (undated) DEVICE — SUTURE MCRYL SZ 3-0 L27IN ABSRB UD L19MM PS-2 3/8 CIR PRIM Y427H

## (undated) DEVICE — MASTISOL ADHESIVE LIQ 2/3ML

## (undated) DEVICE — 2000CC GUARDIAN II: Brand: GUARDIAN

## (undated) DEVICE — HANDPIECE SET WITH COAXIAL HIGH FLOW TIP AND SUCTION TUBE: Brand: INTERPULSE

## (undated) DEVICE — STOCKINETTE,IMPERVIOUS,12X48,STERILE: Brand: MEDLINE

## (undated) DEVICE — 1.25MM NON-THREADED GUIDE WIRE 150MM

## (undated) DEVICE — SURGICAL PROCEDURE PACK BASIC ST FRANCIS

## (undated) DEVICE — DRESSING,GAUZE,XEROFORM,CURAD,5"X9",ST: Brand: CURAD

## (undated) DEVICE — DRAPE,TOP,102X53,STERILE: Brand: MEDLINE

## (undated) DEVICE — DRILL 4.5MM FOR 5.5MM ANCHOR SL-PLUS

## (undated) DEVICE — SOLUTION IRRIG 3000ML 0.9% SOD CHL FLX CONT 0797208] ICU MEDICAL INC]

## (undated) DEVICE — 1010 S-DRAPE TOWEL DRAPE 10/BX: Brand: STERI-DRAPE™

## (undated) DEVICE — SHEET, DRAPE, SPLIT, STERILE: Brand: MEDLINE

## (undated) DEVICE — SUTURE STRATAFIX SPRL MCRYL + SZ 3-0 L18IN ABSRB UD PS-2 SXMP1B107

## (undated) DEVICE — DRAPE,U/SHT,SPLIT,FILM,60X84,STERILE: Brand: MEDLINE

## (undated) DEVICE — HEALICOIL REGENESORB 5.5 MM                                    THREADED DILATOR, DISPOSABLE: Brand: HEALICOIL

## (undated) DEVICE — DERMABOND SKIN ADH 0.7ML -- DERMABOND ADVANCED 12/BX

## (undated) DEVICE — 3M™ COBAN™ SELF-ADHERENT WRAP, 1586S, STERILE, 6 IN X 5 YD (15 CM X 4,5 M), 12 ROLLS/CASE: Brand: 3M™ COBAN™

## (undated) DEVICE — DRAPE XR C ARM 41X74IN LF --

## (undated) DEVICE — WATERPROOF, BACTERIA PROOF DRESSING WITH ABSORBENT SEE THROUGH PAD: Brand: OPSITE POST-OP VISIBLE 15X10CM CTN 20

## (undated) DEVICE — IMPLANTABLE DEVICE
Type: IMPLANTABLE DEVICE | Site: SHOULDER | Status: NON-FUNCTIONAL
Removed: 2021-05-05

## (undated) DEVICE — SLING ARM M FOR 11-13IN UNIV PREM QUAL BRTH EXTRA PD FAB W/

## (undated) DEVICE — BUTTON SWITCH PENCIL BLADE ELECTRODE, HOLSTER: Brand: EDGE

## (undated) DEVICE — SPONGE LAP 18X18IN STRL -- 5/PK

## (undated) DEVICE — AMD ANTIMICROBIAL GAUZE SPONGES,12 PLY USP TYPE VII, 0.2% POLYHEXAMETHYLENE BIGUANIDE HCI (PHMB): Brand: CURITY

## (undated) DEVICE — SUTURE FIBERWIRE SZ 5 L38IN NONABSORBABLE BLU L48MM 1/2 AR7211